# Patient Record
Sex: MALE | Race: WHITE | NOT HISPANIC OR LATINO
[De-identification: names, ages, dates, MRNs, and addresses within clinical notes are randomized per-mention and may not be internally consistent; named-entity substitution may affect disease eponyms.]

---

## 2017-02-21 ENCOUNTER — APPOINTMENT (OUTPATIENT)
Dept: INTERNAL MEDICINE | Facility: CLINIC | Age: 67
End: 2017-02-21

## 2017-02-21 VITALS
DIASTOLIC BLOOD PRESSURE: 72 MMHG | BODY MASS INDEX: 24.52 KG/M2 | TEMPERATURE: 97 F | SYSTOLIC BLOOD PRESSURE: 134 MMHG | WEIGHT: 185 LBS | HEIGHT: 73 IN

## 2017-02-21 DIAGNOSIS — F32.9 MAJOR DEPRESSIVE DISORDER, SINGLE EPISODE, UNSPECIFIED: ICD-10-CM

## 2017-08-01 ENCOUNTER — MEDICATION RENEWAL (OUTPATIENT)
Age: 67
End: 2017-08-01

## 2020-08-05 ENCOUNTER — APPOINTMENT (OUTPATIENT)
Dept: UROLOGY | Facility: CLINIC | Age: 70
End: 2020-08-05
Payer: MEDICARE

## 2020-08-05 VITALS — TEMPERATURE: 97.9 F

## 2020-08-05 LAB
APPEARANCE: CLEAR
BACTERIA: NEGATIVE
BILIRUBIN URINE: NEGATIVE
BLOOD URINE: NEGATIVE
COLOR: YELLOW
GLUCOSE QUALITATIVE U: NEGATIVE
HYALINE CASTS: 0 /LPF
KETONES URINE: NORMAL
LEUKOCYTE ESTERASE URINE: NEGATIVE
MICROSCOPIC-UA: NORMAL
NITRITE URINE: NEGATIVE
PH URINE: 6
PROTEIN URINE: NORMAL
RED BLOOD CELLS URINE: 2 /HPF
SPECIFIC GRAVITY URINE: 1.02
SQUAMOUS EPITHELIAL CELLS: 0 /HPF
UROBILINOGEN URINE: ABNORMAL
WHITE BLOOD CELLS URINE: 1 /HPF

## 2020-08-05 PROCEDURE — 99204 OFFICE O/P NEW MOD 45 MIN: CPT

## 2020-08-05 NOTE — PHYSICAL EXAM
[General Appearance - Well Nourished] : well nourished [General Appearance - Well Developed] : well developed [Normal Appearance] : normal appearance [Well Groomed] : well groomed [General Appearance - In No Acute Distress] : no acute distress [Edema] : no peripheral edema [Respiration, Rhythm And Depth] : normal respiratory rhythm and effort [Exaggerated Use Of Accessory Muscles For Inspiration] : no accessory muscle use [Abdomen Soft] : soft [Abdomen Tenderness] : non-tender [Costovertebral Angle Tenderness] : no ~M costovertebral angle tenderness [Urethral Meatus] : meatus normal [Urinary Bladder Findings] : the bladder was normal on palpation [Scrotum] : the scrotum was normal [No Prostate Nodules] : no prostate nodules [Testes Mass (___cm)] : there were no testicular masses [Normal Station and Gait] : the gait and station were normal for the patient's age [] : no rash [Oriented To Time, Place, And Person] : oriented to person, place, and time [No Focal Deficits] : no focal deficits [Affect] : the affect was normal [Mood] : the mood was normal [Not Anxious] : not anxious [No Palpable Adenopathy] : no palpable adenopathy

## 2020-08-28 ENCOUNTER — APPOINTMENT (OUTPATIENT)
Dept: UROLOGY | Facility: CLINIC | Age: 70
End: 2020-08-28
Payer: MEDICARE

## 2020-08-28 VITALS
DIASTOLIC BLOOD PRESSURE: 80 MMHG | BODY MASS INDEX: 23.19 KG/M2 | SYSTOLIC BLOOD PRESSURE: 156 MMHG | HEART RATE: 59 BPM | WEIGHT: 175 LBS | RESPIRATION RATE: 17 BRPM | HEIGHT: 73 IN

## 2020-08-28 PROCEDURE — 99215 OFFICE O/P EST HI 40 MIN: CPT

## 2020-08-28 NOTE — HISTORY OF PRESENT ILLNESS
[FreeTextEntry1] : ED\par masturbates and get orgasm \par \par started to develop 5 years\par not even masturbating\par viagra took but it never worked\par \par tried injection didn’t work\par \par has occasional pain at the base of penis when trying to play with himself\par \par  takes baby aspirin and medication for prostate \par \par  dx with prostate cancer. seeing Dr. Cannon - minimal disease

## 2020-08-28 NOTE — PHYSICAL EXAM
[General Appearance - Well Developed] : well developed [General Appearance - Well Nourished] : well nourished [Normal Appearance] : normal appearance [Well Groomed] : well groomed [Abdomen Soft] : soft [General Appearance - In No Acute Distress] : no acute distress [Costovertebral Angle Tenderness] : no ~M costovertebral angle tenderness [Urinary Bladder Findings] : the bladder was normal on palpation [Abdomen Tenderness] : non-tender [Urethral Meatus] : meatus normal [Testes Mass (___cm)] : there were no testicular masses [Scrotum] : the scrotum was normal [Edema] : no peripheral edema [No Prostate Nodules] : no prostate nodules [] : no respiratory distress [Exaggerated Use Of Accessory Muscles For Inspiration] : no accessory muscle use [Respiration, Rhythm And Depth] : normal respiratory rhythm and effort [Affect] : the affect was normal [Oriented To Time, Place, And Person] : oriented to person, place, and time [Not Anxious] : not anxious [Mood] : the mood was normal [Normal Station and Gait] : the gait and station were normal for the patient's age [No Focal Deficits] : no focal deficits [No Palpable Adenopathy] : no palpable adenopathy

## 2020-08-28 NOTE — ASSESSMENT
[FreeTextEntry1] : ED\par long standing \par possible peyronie's \par \par bring back \par for DUS\par \par Cialis 20 mg prn \par \par The submitted E/M billing level for this visit reflects the total time spent on the day of the visit including face-to-face time spent with the patient, non-face-to-face review of medical records and relevant information, documentation, and asynchronous communication with the patient after a visit via phone, email, or patient’s EHR portal after the visit.  \par The medical records reviewed are either scanned into the chart or reviewed with the patient using a patient’s electronic medical records portal for patients with records not available to [facility] via electronic transmission platforms from other institutions and labs. \par Time spend counseling and performing coordination of care was also included in determining the appropriate EM billing level.\par \par I have reviewed and verified information regarding the chief complaint and history recorded by the ancillary staff and/or the patient. I have independently reviewed and interpreted tests performed by other physicians and facilities as necessary. \par \par I have discussed with the patient differential diagnosis, reason for auxiliary tests if ordered, risks, benefits, alternatives, and complications of each form of therapy were discussed.\par \par Total time of visit 40 min,  greater than 50% of that time was spent in counseling patient about the problems listed in plan and coordinating of care. Specifically causes, evaluation, treatment options, risks, complications, and benefits of  available therapies were discussed. Questions were answered.\par

## 2020-09-08 LAB
CHOLEST SERPL-MCNC: 146 MG/DL
CHOLEST/HDLC SERPL: 2.1 RATIO
ESTIMATED AVERAGE GLUCOSE: 114 MG/DL
HBA1C MFR BLD HPLC: 5.6 %
HDLC SERPL-MCNC: 69 MG/DL
LDLC SERPL CALC-MCNC: 69 MG/DL
TESTOST BND SERPL-MCNC: 6.2 PG/ML
TESTOST SERPL-MCNC: 493.5 NG/DL
TRIGL SERPL-MCNC: 43 MG/DL

## 2020-09-11 ENCOUNTER — APPOINTMENT (OUTPATIENT)
Dept: UROLOGY | Facility: CLINIC | Age: 70
End: 2020-09-11
Payer: MEDICARE

## 2020-09-11 PROCEDURE — 99213 OFFICE O/P EST LOW 20 MIN: CPT

## 2020-09-11 NOTE — ASSESSMENT
[FreeTextEntry1] : ED\par needs bring trimix for DUS\par will change to cialis 20 mg three times a week \par \par labs normal - reviewed\par \par will reschedule DUS

## 2020-09-11 NOTE — HISTORY OF PRESENT ILLNESS
[FreeTextEntry1] : here for follow up of ED\par took cialis 20 mg once didn’t work \par \par \par didn’t bring his trimix for DUS

## 2020-10-02 ENCOUNTER — OUTPATIENT (OUTPATIENT)
Dept: OUTPATIENT SERVICES | Facility: HOSPITAL | Age: 70
LOS: 1 days | End: 2020-10-02
Payer: MEDICARE

## 2020-10-02 ENCOUNTER — APPOINTMENT (OUTPATIENT)
Dept: UROLOGY | Facility: CLINIC | Age: 70
End: 2020-10-02
Payer: MEDICARE

## 2020-10-02 DIAGNOSIS — N52.9 MALE ERECTILE DYSFUNCTION, UNSPECIFIED: ICD-10-CM

## 2020-10-02 PROCEDURE — 93980 PENILE VASCULAR STUDY: CPT

## 2020-10-02 PROCEDURE — 54235 NJX CORPORA CAVERNOSA RX AGT: CPT

## 2020-10-02 PROCEDURE — 93980 PENILE VASCULAR STUDY: CPT | Mod: 26

## 2020-10-02 PROCEDURE — 99214 OFFICE O/P EST MOD 30 MIN: CPT | Mod: 25

## 2020-10-02 NOTE — PHYSICAL EXAM
[Urethral Meatus] : meatus normal [Urinary Bladder Findings] : the bladder was normal on palpation [Scrotum] : the scrotum was normal [Testes Mass (___cm)] : there were no testicular masses [No Prostate Nodules] : no prostate nodules [FreeTextEntry1] : palpable scar dorsal shaft

## 2020-10-02 NOTE — HISTORY OF PRESENT ILLNESS
[FreeTextEntry1] : here for follow up of ED\par tried fixed dose cialis 20 mg \par no response \par here for DUS and discuss treatment \par has pain in the penis at base \par \par \par

## 2020-10-02 NOTE — ASSESSMENT
[FreeTextEntry1] : ED\par long standing \par failed response to cialis prn and scheduled\par \par DUS showed venous leak \par organic ED\par Peyronie's plaque\par Injection teaching today with 10 units can increase to 15 units\par \par start colchicine daily for Peyronie's\par plaque dorsum \par distal narrowing 2/3 of penis \par \par safety, risks of IC discussed and injections explained\par \par \par Cialis 20 mg prn \par \par The submitted E/M billing level for this visit reflects the total time spent on the day of the visit including face-to-face time spent with the patient, non-face-to-face review of medical records and relevant information, documentation, and asynchronous communication with the patient after a visit via phone, email, or patient’s EHR portal after the visit.  \par The medical records reviewed are either scanned into the chart or reviewed with the patient using a patient’s electronic medical records portal for patients with records not available to [facility] via electronic transmission platforms from other institutions and labs. \par Time spend counseling and performing coordination of care was also included in determining the appropriate EM billing level.\par \par I have reviewed and verified information regarding the chief complaint and history recorded by the ancillary staff and/or the patient. I have independently reviewed and interpreted tests performed by other physicians and facilities as necessary. \par \par I have discussed with the patient differential diagnosis, reason for auxiliary tests if ordered, risks, benefits, alternatives, and complications of each form of therapy were discussed.\par \par Total time of visit 40 min,  greater than 50% of that time was spent in counseling patient about the problems listed in plan and coordinating of care. Specifically causes, evaluation, treatment options, risks, complications, and benefits of  available therapies were discussed. Questions were answered.\par

## 2020-10-07 DIAGNOSIS — N52.9 MALE ERECTILE DYSFUNCTION, UNSPECIFIED: ICD-10-CM

## 2020-10-07 DIAGNOSIS — N48.6 INDURATION PENIS PLASTICA: ICD-10-CM

## 2021-02-24 ENCOUNTER — APPOINTMENT (OUTPATIENT)
Dept: UROLOGY | Facility: CLINIC | Age: 71
End: 2021-02-24
Payer: MEDICARE

## 2021-02-24 PROCEDURE — 99214 OFFICE O/P EST MOD 30 MIN: CPT

## 2021-02-25 LAB
APPEARANCE: CLEAR
BACTERIA: NEGATIVE
BILIRUBIN URINE: NEGATIVE
BLOOD URINE: NEGATIVE
COLOR: YELLOW
GLUCOSE QUALITATIVE U: NEGATIVE
HYALINE CASTS: 0 /LPF
KETONES URINE: NEGATIVE
LEUKOCYTE ESTERASE URINE: NEGATIVE
MICROSCOPIC-UA: NORMAL
NITRITE URINE: NEGATIVE
PH URINE: 6
PROTEIN URINE: NORMAL
PSA FREE FLD-MCNC: 14 %
PSA FREE SERPL-MCNC: 0.64 NG/ML
PSA SERPL-MCNC: 4.57 NG/ML
RED BLOOD CELLS URINE: 2 /HPF
SPECIFIC GRAVITY URINE: 1.03
SQUAMOUS EPITHELIAL CELLS: 0 /HPF
UROBILINOGEN URINE: NORMAL
WHITE BLOOD CELLS URINE: 1 /HPF

## 2021-03-24 ENCOUNTER — OUTPATIENT (OUTPATIENT)
Dept: OUTPATIENT SERVICES | Facility: HOSPITAL | Age: 71
LOS: 1 days | End: 2021-03-24
Payer: MEDICARE

## 2021-03-24 ENCOUNTER — APPOINTMENT (OUTPATIENT)
Dept: MRI IMAGING | Facility: IMAGING CENTER | Age: 71
End: 2021-03-24
Payer: MEDICARE

## 2021-03-24 ENCOUNTER — RESULT REVIEW (OUTPATIENT)
Age: 71
End: 2021-03-24

## 2021-03-24 DIAGNOSIS — R35.0 FREQUENCY OF MICTURITION: ICD-10-CM

## 2021-03-24 DIAGNOSIS — N40.1 BENIGN PROSTATIC HYPERPLASIA WITH LOWER URINARY TRACT SYMPTOMS: ICD-10-CM

## 2021-03-24 PROCEDURE — A9585: CPT

## 2021-03-24 PROCEDURE — G1004: CPT

## 2021-03-24 PROCEDURE — 72197 MRI PELVIS W/O & W/DYE: CPT

## 2021-03-24 PROCEDURE — 76377 3D RENDER W/INTRP POSTPROCES: CPT | Mod: 26

## 2021-03-24 PROCEDURE — 72197 MRI PELVIS W/O & W/DYE: CPT | Mod: 26,ME

## 2021-03-24 PROCEDURE — 76377 3D RENDER W/INTRP POSTPROCES: CPT

## 2021-06-04 ENCOUNTER — NON-APPOINTMENT (OUTPATIENT)
Age: 71
End: 2021-06-04

## 2021-06-11 ENCOUNTER — APPOINTMENT (OUTPATIENT)
Dept: UROLOGY | Facility: CLINIC | Age: 71
End: 2021-06-11
Payer: MEDICARE

## 2021-06-11 VITALS
BODY MASS INDEX: 23.3 KG/M2 | SYSTOLIC BLOOD PRESSURE: 126 MMHG | WEIGHT: 172 LBS | HEIGHT: 72 IN | DIASTOLIC BLOOD PRESSURE: 66 MMHG | HEART RATE: 54 BPM | TEMPERATURE: 97.8 F

## 2021-06-11 DIAGNOSIS — N48.6 INDURATION PENIS PLASTICA: ICD-10-CM

## 2021-06-11 DIAGNOSIS — N52.9 MALE ERECTILE DYSFUNCTION, UNSPECIFIED: ICD-10-CM

## 2021-06-11 PROCEDURE — 99215 OFFICE O/P EST HI 40 MIN: CPT

## 2021-06-11 RX ORDER — TADALAFIL 20 MG/1
20 TABLET ORAL
Qty: 30 | Refills: 6 | Status: COMPLETED | COMMUNITY
Start: 2020-08-28 | End: 2021-06-11

## 2021-06-11 RX ORDER — COLCHICINE 0.6 MG/1
0.6 TABLET ORAL DAILY
Qty: 60 | Refills: 6 | Status: COMPLETED | COMMUNITY
Start: 2020-10-02 | End: 2021-06-11

## 2021-06-11 RX ORDER — CALCIUM CARB/VITAMIN D3/VIT K1 500-100-40
31G X 5/16" TABLET,CHEWABLE ORAL
Qty: 50 | Refills: 4 | Status: COMPLETED | COMMUNITY
Start: 2020-10-02 | End: 2021-06-11

## 2021-06-11 RX ORDER — PAPAVER/PHENTOLAMINE/ALPROSTAD 150-5-50
150-5-50 VIAL (EA) INTRACAVERNOSAL
Qty: 1 | Refills: 0 | Status: DISCONTINUED | COMMUNITY
Start: 2020-08-28 | End: 2021-06-11

## 2021-06-11 NOTE — PHYSICAL EXAM
[Urethral Meatus] : meatus normal [Urinary Bladder Findings] : the bladder was normal on palpation [Scrotum] : the scrotum was normal [Testes Mass (___cm)] : there were no testicular masses [FreeTextEntry1] : no clear cut scarring in shaft, small plaque at base of penis should not prevent ICI

## 2021-06-11 NOTE — ASSESSMENT
[FreeTextEntry1] : ED\par Peyronie's disease - small plaque - not bothered\par \par tried injection teaching today in office but patient does not have manual dexterity to draw the fluid into the syringe and bed the needle few times\par \par even with reeducation - not capable of injections\par \par wife is not willing to assist \par \par discussed ErecAid but pt said it would be difficult and rather not do it now \par \par \par IPP discussed \par \par oral therapy escalation - was acceptable to patient \par \par scripts were written \par \par I have reviewed MRI - pirad 3 - discussed with dr. Cannon - no surgical intervention planned right now \par \par hence IPP could be a solution if all failed.\par \par total time 45 min of education and teaching management and coordination

## 2021-08-24 ENCOUNTER — APPOINTMENT (OUTPATIENT)
Dept: UROLOGY | Facility: CLINIC | Age: 71
End: 2021-08-24
Payer: MEDICARE

## 2021-08-24 PROCEDURE — 99214 OFFICE O/P EST MOD 30 MIN: CPT

## 2021-08-25 LAB
APPEARANCE: ABNORMAL
BACTERIA: NEGATIVE
BILIRUBIN URINE: NEGATIVE
BLOOD URINE: NEGATIVE
CALCIUM OXALATE CRYSTALS: ABNORMAL
COLOR: YELLOW
GLUCOSE QUALITATIVE U: NEGATIVE
HYALINE CASTS: 0 /LPF
KETONES URINE: NORMAL
LEUKOCYTE ESTERASE URINE: NEGATIVE
MICROSCOPIC-UA: NORMAL
NITRITE URINE: NEGATIVE
PH URINE: 6
PROTEIN URINE: ABNORMAL
PSA FREE FLD-MCNC: 16 %
PSA FREE SERPL-MCNC: 0.84 NG/ML
PSA SERPL-MCNC: 5.25 NG/ML
RED BLOOD CELLS URINE: 4 /HPF
SPECIFIC GRAVITY URINE: 1.03
SQUAMOUS EPITHELIAL CELLS: 1 /HPF
URINE COMMENTS: NORMAL
UROBILINOGEN URINE: ABNORMAL
WHITE BLOOD CELLS URINE: 5 /HPF

## 2021-09-07 ENCOUNTER — APPOINTMENT (OUTPATIENT)
Dept: UROLOGY | Facility: CLINIC | Age: 71
End: 2021-09-07

## 2021-09-28 ENCOUNTER — APPOINTMENT (OUTPATIENT)
Dept: NUCLEAR MEDICINE | Facility: CLINIC | Age: 71
End: 2021-09-28

## 2022-03-01 ENCOUNTER — APPOINTMENT (OUTPATIENT)
Dept: UROLOGY | Facility: CLINIC | Age: 72
End: 2022-03-01
Payer: MEDICARE

## 2022-03-01 PROCEDURE — 99214 OFFICE O/P EST MOD 30 MIN: CPT

## 2022-03-02 LAB
APPEARANCE: CLEAR
BACTERIA: NEGATIVE
BILIRUBIN URINE: NEGATIVE
BLOOD URINE: NEGATIVE
COLOR: YELLOW
GLUCOSE QUALITATIVE U: NEGATIVE
HYALINE CASTS: 1 /LPF
KETONES URINE: NORMAL
LEUKOCYTE ESTERASE URINE: NEGATIVE
MICROSCOPIC-UA: NORMAL
NITRITE URINE: NEGATIVE
PH URINE: 6
PROTEIN URINE: NORMAL
PSA FREE FLD-MCNC: 13 %
PSA FREE SERPL-MCNC: 0.61 NG/ML
PSA SERPL-MCNC: 4.86 NG/ML
RED BLOOD CELLS URINE: 1 /HPF
SPECIFIC GRAVITY URINE: 1.03
SQUAMOUS EPITHELIAL CELLS: 0 /HPF
UROBILINOGEN URINE: NORMAL
WHITE BLOOD CELLS URINE: 1 /HPF

## 2022-09-20 ENCOUNTER — APPOINTMENT (OUTPATIENT)
Dept: UROLOGY | Facility: CLINIC | Age: 72
End: 2022-09-20

## 2022-09-20 DIAGNOSIS — R35.0 FREQUENCY OF MICTURITION: ICD-10-CM

## 2022-09-20 PROCEDURE — 99214 OFFICE O/P EST MOD 30 MIN: CPT

## 2022-09-21 LAB
APPEARANCE: CLEAR
BACTERIA: NEGATIVE
BILIRUBIN URINE: NEGATIVE
BLOOD URINE: NEGATIVE
COLOR: YELLOW
GLUCOSE QUALITATIVE U: NEGATIVE
HYALINE CASTS: 1 /LPF
KETONES URINE: NEGATIVE
LEUKOCYTE ESTERASE URINE: NEGATIVE
MICROSCOPIC-UA: NORMAL
NITRITE URINE: NEGATIVE
PH URINE: 6
PROTEIN URINE: NORMAL
PSA FREE FLD-MCNC: 13 %
PSA FREE SERPL-MCNC: 0.67 NG/ML
PSA SERPL-MCNC: 5.14 NG/ML
RED BLOOD CELLS URINE: 3 /HPF
SPECIFIC GRAVITY URINE: 1.03
SQUAMOUS EPITHELIAL CELLS: 0 /HPF
UROBILINOGEN URINE: ABNORMAL
WHITE BLOOD CELLS URINE: 1 /HPF

## 2023-01-03 ENCOUNTER — APPOINTMENT (OUTPATIENT)
Dept: NEUROLOGY | Facility: CLINIC | Age: 73
End: 2023-01-03

## 2023-03-15 ENCOUNTER — INPATIENT (INPATIENT)
Facility: HOSPITAL | Age: 73
LOS: 4 days | Discharge: HOME | DRG: 884 | End: 2023-03-20
Attending: INTERNAL MEDICINE | Admitting: INTERNAL MEDICINE
Payer: MEDICARE

## 2023-03-15 VITALS
OXYGEN SATURATION: 98 % | DIASTOLIC BLOOD PRESSURE: 89 MMHG | WEIGHT: 160.06 LBS | RESPIRATION RATE: 16 BRPM | TEMPERATURE: 98 F | SYSTOLIC BLOOD PRESSURE: 158 MMHG | HEART RATE: 95 BPM | HEIGHT: 72 IN

## 2023-03-15 DIAGNOSIS — R41.0 DISORIENTATION, UNSPECIFIED: ICD-10-CM

## 2023-03-15 LAB
ALBUMIN SERPL ELPH-MCNC: 4.9 G/DL — SIGNIFICANT CHANGE UP (ref 3.3–5)
ALP SERPL-CCNC: 99 U/L — SIGNIFICANT CHANGE UP (ref 40–120)
ALT FLD-CCNC: 14 U/L — SIGNIFICANT CHANGE UP (ref 10–45)
ANION GAP SERPL CALC-SCNC: 13 MMOL/L — SIGNIFICANT CHANGE UP (ref 5–17)
APPEARANCE UR: CLEAR — SIGNIFICANT CHANGE UP
AST SERPL-CCNC: 22 U/L — SIGNIFICANT CHANGE UP (ref 10–40)
BACTERIA # UR AUTO: NEGATIVE — SIGNIFICANT CHANGE UP
BASOPHILS # BLD AUTO: 0.05 K/UL — SIGNIFICANT CHANGE UP (ref 0–0.2)
BASOPHILS NFR BLD AUTO: 0.8 % — SIGNIFICANT CHANGE UP (ref 0–2)
BILIRUB SERPL-MCNC: 0.8 MG/DL — SIGNIFICANT CHANGE UP (ref 0.2–1.2)
BILIRUB UR-MCNC: NEGATIVE — SIGNIFICANT CHANGE UP
BUN SERPL-MCNC: 32 MG/DL — HIGH (ref 7–23)
CALCIUM SERPL-MCNC: 10.2 MG/DL — SIGNIFICANT CHANGE UP (ref 8.4–10.5)
CHLORIDE SERPL-SCNC: 104 MMOL/L — SIGNIFICANT CHANGE UP (ref 96–108)
CO2 SERPL-SCNC: 24 MMOL/L — SIGNIFICANT CHANGE UP (ref 22–31)
COLOR SPEC: YELLOW — SIGNIFICANT CHANGE UP
CREAT SERPL-MCNC: 1.38 MG/DL — HIGH (ref 0.5–1.3)
DIFF PNL FLD: ABNORMAL
EGFR: 54 ML/MIN/1.73M2 — LOW
EOSINOPHIL # BLD AUTO: 0.06 K/UL — SIGNIFICANT CHANGE UP (ref 0–0.5)
EOSINOPHIL NFR BLD AUTO: 0.9 % — SIGNIFICANT CHANGE UP (ref 0–6)
EPI CELLS # UR: 1 /HPF — SIGNIFICANT CHANGE UP
GLUCOSE SERPL-MCNC: 119 MG/DL — HIGH (ref 70–99)
GLUCOSE UR QL: ABNORMAL
HCT VFR BLD CALC: 36.8 % — LOW (ref 39–50)
HGB BLD-MCNC: 12.1 G/DL — LOW (ref 13–17)
HYALINE CASTS # UR AUTO: 8 /LPF — HIGH (ref 0–2)
IMM GRANULOCYTES NFR BLD AUTO: 0.2 % — SIGNIFICANT CHANGE UP (ref 0–0.9)
KETONES UR-MCNC: SIGNIFICANT CHANGE UP
LEUKOCYTE ESTERASE UR-ACNC: NEGATIVE — SIGNIFICANT CHANGE UP
LYMPHOCYTES # BLD AUTO: 1.28 K/UL — SIGNIFICANT CHANGE UP (ref 1–3.3)
LYMPHOCYTES # BLD AUTO: 19.7 % — SIGNIFICANT CHANGE UP (ref 13–44)
MAGNESIUM SERPL-MCNC: 2.1 MG/DL — SIGNIFICANT CHANGE UP (ref 1.6–2.6)
MCHC RBC-ENTMCNC: 30.9 PG — SIGNIFICANT CHANGE UP (ref 27–34)
MCHC RBC-ENTMCNC: 32.9 GM/DL — SIGNIFICANT CHANGE UP (ref 32–36)
MCV RBC AUTO: 94.1 FL — SIGNIFICANT CHANGE UP (ref 80–100)
MONOCYTES # BLD AUTO: 0.77 K/UL — SIGNIFICANT CHANGE UP (ref 0–0.9)
MONOCYTES NFR BLD AUTO: 11.8 % — SIGNIFICANT CHANGE UP (ref 2–14)
NEUTROPHILS # BLD AUTO: 4.34 K/UL — SIGNIFICANT CHANGE UP (ref 1.8–7.4)
NEUTROPHILS NFR BLD AUTO: 66.6 % — SIGNIFICANT CHANGE UP (ref 43–77)
NITRITE UR-MCNC: NEGATIVE — SIGNIFICANT CHANGE UP
NRBC # BLD: 0 /100 WBCS — SIGNIFICANT CHANGE UP (ref 0–0)
PH UR: 6 — SIGNIFICANT CHANGE UP (ref 5–8)
PHOSPHATE SERPL-MCNC: 3.2 MG/DL — SIGNIFICANT CHANGE UP (ref 2.5–4.5)
PLATELET # BLD AUTO: 269 K/UL — SIGNIFICANT CHANGE UP (ref 150–400)
POTASSIUM SERPL-MCNC: 4.4 MMOL/L — SIGNIFICANT CHANGE UP (ref 3.5–5.3)
POTASSIUM SERPL-SCNC: 4.4 MMOL/L — SIGNIFICANT CHANGE UP (ref 3.5–5.3)
PROT SERPL-MCNC: 7.2 G/DL — SIGNIFICANT CHANGE UP (ref 6–8.3)
PROT UR-MCNC: ABNORMAL
RAPID RVP RESULT: SIGNIFICANT CHANGE UP
RBC # BLD: 3.91 M/UL — LOW (ref 4.2–5.8)
RBC # FLD: 12.4 % — SIGNIFICANT CHANGE UP (ref 10.3–14.5)
RBC CASTS # UR COMP ASSIST: 3 /HPF — SIGNIFICANT CHANGE UP (ref 0–4)
SARS-COV-2 RNA SPEC QL NAA+PROBE: SIGNIFICANT CHANGE UP
SODIUM SERPL-SCNC: 141 MMOL/L — SIGNIFICANT CHANGE UP (ref 135–145)
SP GR SPEC: 1.02 — SIGNIFICANT CHANGE UP (ref 1.01–1.02)
UROBILINOGEN FLD QL: ABNORMAL
WBC # BLD: 6.51 K/UL — SIGNIFICANT CHANGE UP (ref 3.8–10.5)
WBC # FLD AUTO: 6.51 K/UL — SIGNIFICANT CHANGE UP (ref 3.8–10.5)
WBC UR QL: 2 /HPF — SIGNIFICANT CHANGE UP (ref 0–5)

## 2023-03-15 PROCEDURE — 70450 CT HEAD/BRAIN W/O DYE: CPT | Mod: 26,MA

## 2023-03-15 PROCEDURE — 71045 X-RAY EXAM CHEST 1 VIEW: CPT | Mod: 26

## 2023-03-15 PROCEDURE — 99285 EMERGENCY DEPT VISIT HI MDM: CPT

## 2023-03-15 RX ORDER — CHOLECALCIFEROL (VITAMIN D3) 125 MCG
1 CAPSULE ORAL
Qty: 0 | Refills: 0 | DISCHARGE

## 2023-03-15 RX ORDER — SODIUM CHLORIDE 9 MG/ML
1000 INJECTION INTRAMUSCULAR; INTRAVENOUS; SUBCUTANEOUS ONCE
Refills: 0 | Status: COMPLETED | OUTPATIENT
Start: 2023-03-15 | End: 2023-03-15

## 2023-03-15 RX ORDER — ESCITALOPRAM OXALATE 10 MG/1
10 TABLET, FILM COATED ORAL DAILY
Refills: 0 | Status: DISCONTINUED | OUTPATIENT
Start: 2023-03-15 | End: 2023-03-16

## 2023-03-15 RX ORDER — HEPARIN SODIUM 5000 [USP'U]/ML
5000 INJECTION INTRAVENOUS; SUBCUTANEOUS EVERY 8 HOURS
Refills: 0 | Status: DISCONTINUED | OUTPATIENT
Start: 2023-03-15 | End: 2023-03-20

## 2023-03-15 RX ORDER — ATORVASTATIN CALCIUM 80 MG/1
10 TABLET, FILM COATED ORAL AT BEDTIME
Refills: 0 | Status: DISCONTINUED | OUTPATIENT
Start: 2023-03-15 | End: 2023-03-20

## 2023-03-15 RX ORDER — MIDODRINE HYDROCHLORIDE 2.5 MG/1
10 TABLET ORAL
Refills: 0 | Status: DISCONTINUED | OUTPATIENT
Start: 2023-03-15 | End: 2023-03-16

## 2023-03-15 RX ADMIN — HEPARIN SODIUM 5000 UNIT(S): 5000 INJECTION INTRAVENOUS; SUBCUTANEOUS at 21:08

## 2023-03-15 RX ADMIN — ATORVASTATIN CALCIUM 10 MILLIGRAM(S): 80 TABLET, FILM COATED ORAL at 21:09

## 2023-03-15 RX ADMIN — SODIUM CHLORIDE 1000 MILLILITER(S): 9 INJECTION INTRAMUSCULAR; INTRAVENOUS; SUBCUTANEOUS at 14:15

## 2023-03-15 RX ADMIN — SODIUM CHLORIDE 1000 MILLILITER(S): 9 INJECTION INTRAMUSCULAR; INTRAVENOUS; SUBCUTANEOUS at 15:38

## 2023-03-15 NOTE — H&P ADULT - NSHPLABSRESULTS_GEN_ALL_CORE
LABS:                        12.1   6.51  )-----------( 269      ( 15 Mar 2023 12:36 )             36.8     03-15    141  |  104  |  32<H>  ----------------------------<  119<H>  4.4   |  24  |  1.38<H>    Ca    10.2      15 Mar 2023 12:36  Phos  3.2     03-15  Mg     2.1     -15    TPro  7.2  /  Alb  4.9  /  TBili  0.8  /  DBili  x   /  AST  22  /  ALT  14  /  AlkPhos  99  03-15      Urinalysis Basic - ( 15 Mar 2023 12:36 )    Color: Yellow / Appearance: Clear / S.023 / pH: x  Gluc: x / Ketone: Trace  / Bili: Negative / Urobili: 2 mg/dL   Blood: x / Protein: 30 mg/dL / Nitrite: Negative   Leuk Esterase: Negative / RBC: 3 /hpf / WBC 2 /HPF   Sq Epi: x / Non Sq Epi: 1 /hpf / Bacteria: Negative            RADIOLOGY & ADDITIONAL TESTS:

## 2023-03-15 NOTE — ED PROVIDER NOTE - NS ED ROS FT
ROS:  Constitutional: no fevers; no chills  HEENT: no visual changes, no sore throat, no rhinorrhea  CV: no cp; no palpitations  Resp: no sob; no cough  GI: no abd pain, no nausea, no vomiting, no diarrhea, no constipation  : no dysuria, no hematuria  MSK: no myalgias; no arthralgias  skin: no rashes  neuro: no HA, no numbness; no weakness, no tingling  endo: no polyuria, no polydipsia ROS:  Constitutional: no fevers; no chills  HEENT: no visual changes, no sore throat, no rhinorrhea  CV: no cp; no palpitations  Resp: no sob; no cough  GI: no abd pain, no nausea, no vomiting, no diarrhea, no constipation  : no dysuria, no hematuria; urinary frequency  MSK: no myalgias; no arthralgias  skin: no rashes  neuro: no HA, no numbness; no weakness, no tingling  endo: no polyuria, no polydipsia

## 2023-03-15 NOTE — H&P ADULT - ASSESSMENT
74 yo M PMHx HLD, elevated PSA, anxiety/depression on Lexapro, orthostatic hypotension on Midodrine, presents to the ED for delusions & AMS x3 weeks. 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 Last night, per daughter, his delusions were the worst it has been and was unable to be re-oriented. Currently, pt is back to his baseline. Pt also having urinary frequency without dysuria. Of note, pt was tx'd with amoxicillin for a dental infection 3 weeks ago. Pt reportedly fell last week and hit his head. he is eating and drinking normally. he denies pain anywhere except his right neck, which pt has had for a while. he denies cp, sob, abd pain, n/v/d, cough, palpitations.    delirium  - likely progressing dementia  - b12, folate, tsh  - brain mri  - neurology consult    BPH  - consider starting flomax    orthostatic hypotension  - c/w midodrine    depression  - c/w lexapro  - psych eval    HLD  - c/w lipitor    dvt px   - lheparin

## 2023-03-15 NOTE — ED PROVIDER NOTE - PHYSICAL EXAMINATION
PHYSICAL EXAM:  GENERAL: non-toxic appearing; in no respiratory distress  HEAD Atraumatic, Normocephalic  ENT: in Left upper mouth: no pustules, no drainage, no signs of infeciton; no stridor; no drooling  NECK: No JVD; trachea midline; ttp to left neck, no rash; no midline ttp  EYES: PERRL, EOMs intact b/l w/out deficits; normal conjunctiva  CHEST/LUNG: CTAB no wheezes/rhonchi/rales  HEART: RRR no murmur/gallops/rubs  ABDOMEN: soft, NT, ND  EXTREMITIES: No LE edema, +2 radial pulses b/l, +2 DP/PT pulses b/l  MUSCULOSKELETAL: FROM of all 4 extremities;  NERVOUS SYSTEM:  A&Ox3, No motor deficits or sensory deficits; CNII-XII intact; has word finding difficulty (has been present for 3 years); no drift;  SKIN:  Warm and dry as visualized; superficial abrasion of scalp, no lacerations;

## 2023-03-15 NOTE — ED PROVIDER NOTE - OBJECTIVE STATEMENT
72 yo M PMHx HLD, elevated PSA, anxiety/depression on Lexapro, orthostatic hypotension on Midodrine, presents to the ED for delusions & AMS x3 weeks. 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 Last night, per daughter, his delusions were the worst it has been and was unable to be re-oriented. Currently, pt is back to his baseline. Pt also having urinary frequency without dysuria. Of note, pt was tx'd with amoxicillin for a dental infection 3 weeks ago. Pt reportedly fell last week and hit his head. he is eating and drinking normally. he denies pain anywhere except his right neck, which pt has had for a while. he denies cp, sob, abd pain, n/v/d, cough, palpitations.    PMD DR. Bakari Fraire 74 yo M PMHx HLD, elevated PSA, anxiety/depression on Lexapro, orthostatic hypotension on Midodrine, presents to the ED for delusions & AMS x3 weeks. 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 Last night, per daughter, his delusions were the worst it has been and was unable to be re-oriented. Currently, pt is back to his baseline. Pt also having urinary frequency without dysuria. Of note, pt was tx'd with amoxicillin for a dental infection 3 weeks ago. Pt reportedly fell last week and hit his head. he is eating and drinking normally. he denies pain anywhere except his right neck, which pt has had for a while. he denies cp, sob, abd pain, n/v/d, cough, palpitations.    PMD DR. Bakari Fraire

## 2023-03-15 NOTE — ED ADULT NURSE NOTE - OBJECTIVE STATEMENT
PT is a 73 year old male, currently A&OX1, with PMH of confusion, anxiety, HLD, and orthostatic hypotension on Midodrine who presents to the ED from home with c/o AMS. PT's daughter at the bedside, serving as historian, states PT is intermittently confused at baseline but still cares for himself independently and is able to walk, drive, cook, and grocery shop for himself. Daughter states 3 weeks ago PT had a root canal for a mouth infection and was started on Amoxicillin and that he has 3 days of antibiotics left. Daughter also states that his Lexapro dose was increased recently and PT became acutely more confused so the dose was decreased, additionally, PT's neurologist added Seroquel last week and PT became "delusional" stating "my wife is going to kill me" so PT stopped this medication after taking it for 3 days. PT's daughter states PT's increased AMS began ~3 weeks ago and is intermittent. PT also endorsing frequent urination with small streams. PT denies chest pain, SOB, dysuria, fevers at home, N/V/D, and dizziness. PT is resting comfortably in bed, breathing unlabored on room air, and having trouble getting his words out/forming coherent sentences. Abdomen is soft, non-tender, and non-distended. Skin is warm and dry, no diaphoresis noted. No edema noted to B/L extremities. Strong strength in B/L extremities, sensation intact. IV access established 18G in left AC. PT placed in hospital gown, non-slip socks applied. PT ambulatory with steady gait with 1x assist. Safety and comfort maintained. Daughter at the bedside.

## 2023-03-15 NOTE — ED PROVIDER NOTE - NS ED MD TWO NIGHTS YN
[Awake] : awake [Alert] : alert [Acute Distress] : no acute distress [Mass] : no breast mass [Nipple Discharge] : no nipple discharge [Axillary LAD] : no axillary lymphadenopathy [Soft] : soft [Tender] : non tender [Oriented x3] : oriented to person, place, and time [Normal] : uterus [Atrophy] : atrophy [No Bleeding] : there was no active vaginal bleeding [Uterine Adnexae] : were not tender and not enlarged Yes

## 2023-03-15 NOTE — ED PROVIDER NOTE - CLINICAL SUMMARY MEDICAL DECISION MAKING FREE TEXT BOX
Mp Darling MD. pt presenting for worsenign delusions x3 weeks, worse last night, possibly i/s/o medication changes (increased lexapro, had a short course of seroquel rx'd by neurologist). pt also with urinary frequency without d ysuria. pt well appearing. hd stable. neuro intact. had a fall 1 week ago and there is a small scalp abrasion (no reported LOC; no AC use). pt has no focal acute neurologic deficits on exam. lungs cta. rrr nl s1/s2. abd is soft nt nd. no signs of worsening dental abscess or airway compromise and thus, low suspicion for deep soft tissue infection such as rpa or ann's. possible traumatic ich from his fall, likely 2/2 medication use. possible infectious vs metabolic encephalopathy. given fall and head strike, will CTH. Will check CBC to eval WBC, anemia, plt count; CMP to eval for lyte abnormalities, renal and/or liver dysfunction. will check UA. after discussion w/ daughter, given that pt may have a safety issue at home (leaving in middle of night), pt may require admission for further w/u.

## 2023-03-15 NOTE — ED ADULT TRIAGE NOTE - CHIEF COMPLAINT QUOTE
AMS x 2 weeks since having a mouth infection. pt reports frequent urination. has been followed with PCP and neurologist.

## 2023-03-15 NOTE — ED PROVIDER NOTE - ATTENDING APP SHARED VISIT CONTRIBUTION OF CARE
I, Mp Darling, performed a history and physical exam of the patient and discussed their management with the resident and/or advanced care provider. I reviewed the resident and/or advanced care provider's note and agree with the documented findings and plan of care except where noted. I was present and available for all procedures.    I wrote H&P and mdm see above

## 2023-03-15 NOTE — H&P ADULT - HISTORY OF PRESENT ILLNESS
72 yo M PMHx HLD, elevated PSA, anxiety/depression on Lexapro, orthostatic hypotension on Midodrine, presents to the ED for delusions & AMS x3 weeks. 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 Last night, per daughter, his delusions were the worst it has been and was unable to be re-oriented. Currently, pt is back to his baseline. Pt also having urinary frequency without dysuria. Of note, pt was tx'd with amoxicillin for a dental infection 3 weeks ago. Pt reportedly fell last week and hit his head. he is eating and drinking normally. he denies pain anywhere except his right neck, which pt has had for a while. he denies cp, sob, abd pain, n/v/d, cough, palpitations

## 2023-03-15 NOTE — PATIENT PROFILE ADULT - FALL HARM RISK - HARM RISK INTERVENTIONS
Assistance with ambulation/Assistance OOB with selected safe patient handling equipment/Communicate Risk of Fall with Harm to all staff/Monitor for mental status changes/Move patient closer to nurses' station/Reinforce activity limits and safety measures with patient and family/Reorient to person, place and time as needed/Tailored Fall Risk Interventions/Toileting schedule using arm’s reach rule for commode and bathroom/Use of alarms - bed, chair and/or voice tab/Visual Cue: Yellow wristband and red socks/Bed in lowest position, wheels locked, appropriate side rails in place/Call bell, personal items and telephone in reach/Instruct patient to call for assistance before getting out of bed or chair/Non-slip footwear when patient is out of bed/[place] to call system/Physically safe environment - no spills, clutter or unnecessary equipment/Purposeful Proactive Rounding/Room/bathroom lighting operational, light cord in reach
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
## 2023-03-16 LAB
A1C WITH ESTIMATED AVERAGE GLUCOSE RESULT: 5.7 % — HIGH (ref 4–5.6)
ANION GAP SERPL CALC-SCNC: 11 MMOL/L — SIGNIFICANT CHANGE UP (ref 5–17)
BUN SERPL-MCNC: 29 MG/DL — HIGH (ref 7–23)
CALCIUM SERPL-MCNC: 9.6 MG/DL — SIGNIFICANT CHANGE UP (ref 8.4–10.5)
CHLORIDE SERPL-SCNC: 109 MMOL/L — HIGH (ref 96–108)
CO2 SERPL-SCNC: 23 MMOL/L — SIGNIFICANT CHANGE UP (ref 22–31)
CREAT SERPL-MCNC: 1.23 MG/DL — SIGNIFICANT CHANGE UP (ref 0.5–1.3)
CULTURE RESULTS: SIGNIFICANT CHANGE UP
EGFR: 62 ML/MIN/1.73M2 — SIGNIFICANT CHANGE UP
ESTIMATED AVERAGE GLUCOSE: 117 MG/DL — HIGH (ref 68–114)
FOLATE SERPL-MCNC: 12.1 NG/ML — SIGNIFICANT CHANGE UP
GLUCOSE BLDC GLUCOMTR-MCNC: 135 MG/DL — HIGH (ref 70–99)
GLUCOSE SERPL-MCNC: 93 MG/DL — SIGNIFICANT CHANGE UP (ref 70–99)
HCT VFR BLD CALC: 33.9 % — LOW (ref 39–50)
HCV AB S/CO SERPL IA: 0.08 S/CO — SIGNIFICANT CHANGE UP (ref 0–0.99)
HCV AB SERPL-IMP: SIGNIFICANT CHANGE UP
HGB BLD-MCNC: 10.6 G/DL — LOW (ref 13–17)
MAGNESIUM SERPL-MCNC: 2.1 MG/DL — SIGNIFICANT CHANGE UP (ref 1.6–2.6)
MCHC RBC-ENTMCNC: 29.6 PG — SIGNIFICANT CHANGE UP (ref 27–34)
MCHC RBC-ENTMCNC: 31.3 GM/DL — LOW (ref 32–36)
MCV RBC AUTO: 94.7 FL — SIGNIFICANT CHANGE UP (ref 80–100)
NRBC # BLD: 0 /100 WBCS — SIGNIFICANT CHANGE UP (ref 0–0)
PHOSPHATE SERPL-MCNC: 2.9 MG/DL — SIGNIFICANT CHANGE UP (ref 2.5–4.5)
PLATELET # BLD AUTO: 246 K/UL — SIGNIFICANT CHANGE UP (ref 150–400)
POTASSIUM SERPL-MCNC: 4.2 MMOL/L — SIGNIFICANT CHANGE UP (ref 3.5–5.3)
POTASSIUM SERPL-SCNC: 4.2 MMOL/L — SIGNIFICANT CHANGE UP (ref 3.5–5.3)
RBC # BLD: 3.58 M/UL — LOW (ref 4.2–5.8)
RBC # FLD: 12.3 % — SIGNIFICANT CHANGE UP (ref 10.3–14.5)
SODIUM SERPL-SCNC: 143 MMOL/L — SIGNIFICANT CHANGE UP (ref 135–145)
SPECIMEN SOURCE: SIGNIFICANT CHANGE UP
TSH SERPL-MCNC: 1.47 UIU/ML — SIGNIFICANT CHANGE UP (ref 0.27–4.2)
VIT B12 SERPL-MCNC: 723 PG/ML — SIGNIFICANT CHANGE UP (ref 232–1245)
WBC # BLD: 7.33 K/UL — SIGNIFICANT CHANGE UP (ref 3.8–10.5)
WBC # FLD AUTO: 7.33 K/UL — SIGNIFICANT CHANGE UP (ref 3.8–10.5)

## 2023-03-16 PROCEDURE — 70553 MRI BRAIN STEM W/O & W/DYE: CPT | Mod: 26

## 2023-03-16 RX ORDER — ESCITALOPRAM OXALATE 10 MG/1
5 TABLET, FILM COATED ORAL DAILY
Refills: 0 | Status: DISCONTINUED | OUTPATIENT
Start: 2023-03-16 | End: 2023-03-20

## 2023-03-16 RX ADMIN — ESCITALOPRAM OXALATE 10 MILLIGRAM(S): 10 TABLET, FILM COATED ORAL at 11:52

## 2023-03-16 RX ADMIN — ATORVASTATIN CALCIUM 10 MILLIGRAM(S): 80 TABLET, FILM COATED ORAL at 21:07

## 2023-03-16 RX ADMIN — HEPARIN SODIUM 5000 UNIT(S): 5000 INJECTION INTRAVENOUS; SUBCUTANEOUS at 05:55

## 2023-03-16 RX ADMIN — HEPARIN SODIUM 5000 UNIT(S): 5000 INJECTION INTRAVENOUS; SUBCUTANEOUS at 21:07

## 2023-03-16 RX ADMIN — HEPARIN SODIUM 5000 UNIT(S): 5000 INJECTION INTRAVENOUS; SUBCUTANEOUS at 11:52

## 2023-03-16 NOTE — BH CONSULTATION LIAISON ASSESSMENT NOTE - NSBHCHARTREVIEWVS_PSY_A_CORE FT
Vital Signs Last 24 Hrs  T(C): 36.8 (16 Mar 2023 12:27), Max: 37.3 (16 Mar 2023 06:00)  T(F): 98.2 (16 Mar 2023 12:27), Max: 99.1 (16 Mar 2023 06:00)  HR: 70 (16 Mar 2023 12:27) (70 - 87)  BP: 130/69 (16 Mar 2023 12:27) (112/70 - 177/89)  BP(mean): 97 (15 Mar 2023 16:25) (97 - 97)  RR: 18 (16 Mar 2023 12:27) (18 - 18)  SpO2: 96% (16 Mar 2023 12:27) (96% - 99%)    Parameters below as of 16 Mar 2023 06:00  Patient On (Oxygen Delivery Method): room air

## 2023-03-16 NOTE — BH CONSULTATION LIAISON ASSESSMENT NOTE - NSBHCHARTREVIEWLAB_PSY_A_CORE FT
CBC Full  -  ( 16 Mar 2023 07:15 )  WBC Count : 7.33 K/uL  Hemoglobin : 10.6 g/dL  Hematocrit : 33.9 %  Platelet Count - Automated : 246 K/uL  Mean Cell Volume : 94.7 fl  Mean Cell Hemoglobin : 29.6 pg  Mean Cell Hemoglobin Concentration : 31.3 gm/dL  Auto Neutrophil # : x  Auto Lymphocyte # : x  Auto Monocyte # : x  Auto Eosinophil # : x  Auto Basophil # : x  Auto Neutrophil % : x  Auto Lymphocyte % : x  Auto Monocyte % : x  Auto Eosinophil % : x  Auto Basophil % : x    03-16    143  |  109<H>  |  29<H>  ----------------------------<  93  4.2   |  23  |  1.23    Ca    9.6      16 Mar 2023 07:17  Phos  2.9     03-16  Mg     2.1     03-16    TPro  7.2  /  Alb  4.9  /  TBili  0.8  /  DBili  x   /  AST  22  /  ALT  14  /  AlkPhos  99  03-15  < from: CT Head No Cont (03.15.23 @ 14:05) >    IMPRESSION:  Unremarkable head CT.   Ischemic white matter disease and   atrophy typical for age.      < end of copied text >

## 2023-03-16 NOTE — BH CONSULTATION LIAISON ASSESSMENT NOTE - SUMMARY
73 year old WM with probable baseline dementia. Type of dementia unclear. Given his shuffling gait described by the family (parkinsonism), visual hallucinations, and sx. of REM sleep behavior disorder, would consider Lewy Body Dementia. Given this possibility, would avoid Haldol.   He suffers from orthostatic hypotension in the past so would avoid Seroquel.

## 2023-03-16 NOTE — BH CONSULTATION LIAISON ASSESSMENT NOTE - HPI (INCLUDE ILLNESS QUALITY, SEVERITY, DURATION, TIMING, CONTEXT, MODIFYING FACTORS, ASSOCIATED SIGNS AND SYMPTOMS)
The pt. is a  73 year old WM with 3 year history of cognitive decline (short term memory loss, word finding difficulty). Neurologists Dr. Saunders and Dr. Faria did not state what type of dementia the pt. suffers from. The pt. had his Lexapro dose increased by his neurologist 3 weeks ago to 20mg/day and 3 days later he had a change in mental status (paranoia including thinking his wife poisoned him and thinks he still works in the [employer] business). Family clarified that he has not wandered out of the house but rather, he wanted to leave the house. He also had a tooth infection and treated with Amoxicillin. His neurologist gave Seroquel 25mg qhs last Thursday-Saturday but it caused the pt. to be lightheaded and he fell down. Seroquel then was discontinued. He has a history of orthostatic hypotension. Wife says the pt. is usually sensitive to medication side effects. The family lowered the dose of Lexapro back to 10mg/day 3 days ago. Neurologist Dr. Faria thinks the pt. may suffer from Frontal Temporal Dementia as last year the PET scan showed abnormal uptake (but family cannot say location of impaired uptake). The pt. for the past 2 days has visual hallucinations as he saw a man not present in the room. Has shuffling gait at times per family but no tremors or rigidity. Has been known to jerk in his sleep and yell out during sleep. Head CT scan here negative for bleed/stroke/mass. He reports good sleep and appetite.

## 2023-03-16 NOTE — BH CONSULTATION LIAISON ASSESSMENT NOTE - NSBHCONSULTRECOMMENDOTHER_PSY_A_CORE FT
Lower the dose of Lexapro to 5mg p.o. qd  Avoid NSAIDs while taking Lexapro (risk of bleeding)  No Haldol  Orthostatic blood pressure check  Neurology follow up. Asked family to bring in results of PET scan and neuropsychological testing from last year

## 2023-03-16 NOTE — BH CONSULTATION LIAISON ASSESSMENT NOTE - NSBHMSESPABN_PSY_A_CORE
Some difficulty repeating.  Some word finding difficulty. Says the current President of the [place] is "Iden."/Soft volume/Other

## 2023-03-16 NOTE — BH CONSULTATION LIAISON ASSESSMENT NOTE - OTHER PAST PSYCHIATRIC HISTORY (INCLUDE DETAILS REGARDING ONSET, COURSE OF ILLNESS, INPATIENT/OUTPATIENT TREATMENT)
Never psychiatrically hospitalized. Never suicidal. Over the years he has been depressed, anxious, and mildly "paranoid" per family. In the past he has taken Klonopin and Cymbalta. Has compulsive personality traits as change in his environment or routine upsets him very much.

## 2023-03-16 NOTE — PROGRESS NOTE ADULT - ASSESSMENT
74 yo M PMHx HLD, elevated PSA, anxiety/depression on Lexapro, orthostatic hypotension on Midodrine, presents to the ED for delusions & AMS x3 weeks. 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 Last night, per daughter, his delusions were the worst it has been and was unable to be re-oriented. Currently, pt is back to his baseline. Pt also having urinary frequency without dysuria. Of note, pt was tx'd with amoxicillin for a dental infection 3 weeks ago. Pt reportedly fell last week and hit his head. he is eating and drinking normally. he denies pain anywhere except his right neck, which pt has had for a while. he denies cp, sob, abd pain, n/v/d, cough, palpitations.    delirium  - likely progressing dementia  - b12, folate, tsh are normal  - brain mri  - neurology consult  - psych consult    BPH  - consider starting flomax    orthostatic hypotension  - hold midodrine    depression  - c/w lexapro  - psych eval    HLD  - c/w lipitor    dvt px   - heparin

## 2023-03-16 NOTE — BH CONSULTATION LIAISON ASSESSMENT NOTE - NSBHMSEREMMEM_PSY_A_CORE
He says he is upset that "they (people at work) call me." When asked to explain this a moment later, he forgot he said the above. He says his "two daughters" are in the hallway when in fact it is his wife and daughter in the hallway. He says he has 3 children and cannot name them properly./Impaired

## 2023-03-16 NOTE — PHYSICAL THERAPY INITIAL EVALUATION ADULT - ADDITIONAL COMMENTS
Pt lives in an apartment with wife there are 0 steps to enter, first floor set up. Pt performed ADL/IADLs independently. Ambulates with no AD. One fall within the last 12 months. History taken from family at bedside. Pt lives in an apartment with wife there are 0 steps to enter, first floor set up. Pt performed ADL/IADLs independently. Ambulates with no AD. One fall within the last 12 months. History taken from family at bedside. Pt presents with some expressive aphasia.

## 2023-03-16 NOTE — BH CONSULTATION LIAISON ASSESSMENT NOTE - DESCRIPTION
Graduated high school and worked for years in the [employer] industry. 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 No cigarettes, alcohol abuse, nor illicit drug use.

## 2023-03-16 NOTE — CONSULT NOTE ADULT - ASSESSMENT
72 yo M with HLD, elevated PSA, anxiety/depression on Lexapro, orthostatic hypotension on Midodrine, dementia (followed with 2 neurologist Dr. Mayers and Dr. Marco Faria) presents to the ED for delusions & AMS x3 weeks. 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 Last night, per daughter, his delusions were the worst it has been and was unable to be re-oriented. Currently, pt is back to his baseline. Pt also having urinary frequency without dysuria. Of note, pt was tx'd with amoxicillin for a dental infection 3 weeks ago. Pt reportedly fell last week and hit his head. he is eating and drinking normally. he denies pain anywhere except his right neck, which pt has had for a while. he denies cp, sob, abd pain, n/v/d, cough, palpitations    last MRI and EEG > 1 year ago including PET scan   oddly when patient is delerius is WFD resolves, indicating there is a component of anxiety   CTH neg   B12 adn TSH WNL     Impression:   Dementia on top of Anxiety     - MRI brain w/ and w/o and EEG  - psych eval  - delerium precuations   - check FS, glucose control <180  - GI/DVT ppx  - Counseling on diet, exercise, and medication adherence was done  - Counseling on smoking cessation and alcohol consumption offered when appropriate.  - Pain assessed and judicious use of narcotics when appropriate was discussed.    - Stroke education given when appropriate.  - Importance of fall prevention discussed.   - Differential diagnosis and plan of care discussed with patient and/or family and primary team  - Thank you for allowing me to participate in the care of this patient. Call with questions.   - spoke with wife and daughter at bedside   Solomon Pradhan MD  Vascular Neurology  Office: [phone]        72 yo M with HLD, elevated PSA, anxiety/depression on Lexapro, orthostatic hypotension on Midodrine, dementia (followed with 2 neurologist Dr. Mayers and Dr. Marco Faria) presents to the ED for delusions & AMS x3 weeks. 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 Last night, per daughter, his delusions were the worst it has been and was unable to be re-oriented. Currently, pt is back to his baseline. Pt also having urinary frequency without dysuria. Of note, pt was tx'd with amoxicillin for a dental infection 3 weeks ago. Pt reportedly fell last week and hit his head. he is eating and drinking normally. he denies pain anywhere except his right neck, which pt has had for a while. he denies cp, sob, abd pain, n/v/d, cough, palpitations    last MRI and EEG > 1 year ago including PET scan   oddly when patient is delerius is WFD resolves, indicating there is a component of anxiety   CTH neg   B12 adn TSH WNL     Impression:   Dementia on top of Anxiety   possible lewy body?     - MRI brain w/ and w/o and EEG  - psych eval  - delerium precuations   - check FS, glucose control <180  - GI/DVT ppx  - Counseling on diet, exercise, and medication adherence was done  - Counseling on smoking cessation and alcohol consumption offered when appropriate.  - Pain assessed and judicious use of narcotics when appropriate was discussed.    - Stroke education given when appropriate.  - Importance of fall prevention discussed.   - Differential diagnosis and plan of care discussed with patient and/or family and primary team  - Thank you for allowing me to participate in the care of this patient. Call with questions.   - spoke with wife and daughter at bedside (happens to be PA)    - would obtain otuaptient records if able (had neuropsych testing and PET scan)   Solomon Pradhan MD  Vascular Neurology  Office: [phone]

## 2023-03-16 NOTE — PHYSICAL THERAPY INITIAL EVALUATION ADULT - PERTINENT HX OF CURRENT PROBLEM, REHAB EVAL
72 yo M PMHx HLD, elevated PSA, anxiety/depression on Lexapro, orthostatic hypotension on Midodrine, presents to the ED for delusions & AMS x3 weeks. 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 Last night, per daughter, his delusions were the worst it has been and was unable to be re-oriented. Currently, pt is back to his baseline. Pt also having urinary frequency without dysuria. Of note, pt was tx'd with amoxicillin for a dental infection 3 weeks ago. Pt reportedly fell last week and hit his head. he is eating and drinking normally. he denies pain anywhere except his right neck, which pt has had for a while. he denies cp, sob, abd pain, n/v/d, cough, palpitations. 3/15 CTH Unremarkable head CT.   Ischemic white matter disease and atrophy typical for age. CXR clear lungs

## 2023-03-16 NOTE — BH CONSULTATION LIAISON ASSESSMENT NOTE - CURRENT MEDICATION
MEDICATIONS  (STANDING):  atorvastatin 10 milliGRAM(s) Oral at bedtime  escitalopram 10 milliGRAM(s) Oral daily  heparin   Injectable 5000 Unit(s) SubCutaneous every 8 hours    MEDICATIONS  (PRN):

## 2023-03-17 LAB
ANION GAP SERPL CALC-SCNC: 11 MMOL/L — SIGNIFICANT CHANGE UP (ref 5–17)
BUN SERPL-MCNC: 29 MG/DL — HIGH (ref 7–23)
CALCIUM SERPL-MCNC: 9.6 MG/DL — SIGNIFICANT CHANGE UP (ref 8.4–10.5)
CHLORIDE SERPL-SCNC: 107 MMOL/L — SIGNIFICANT CHANGE UP (ref 96–108)
CO2 SERPL-SCNC: 24 MMOL/L — SIGNIFICANT CHANGE UP (ref 22–31)
CREAT SERPL-MCNC: 1.21 MG/DL — SIGNIFICANT CHANGE UP (ref 0.5–1.3)
EGFR: 64 ML/MIN/1.73M2 — SIGNIFICANT CHANGE UP
GLUCOSE BLDC GLUCOMTR-MCNC: 128 MG/DL — HIGH (ref 70–99)
GLUCOSE BLDC GLUCOMTR-MCNC: 96 MG/DL — SIGNIFICANT CHANGE UP (ref 70–99)
GLUCOSE SERPL-MCNC: 98 MG/DL — SIGNIFICANT CHANGE UP (ref 70–99)
HCT VFR BLD CALC: 34.7 % — LOW (ref 39–50)
HGB BLD-MCNC: 11.2 G/DL — LOW (ref 13–17)
MCHC RBC-ENTMCNC: 30 PG — SIGNIFICANT CHANGE UP (ref 27–34)
MCHC RBC-ENTMCNC: 32.3 GM/DL — SIGNIFICANT CHANGE UP (ref 32–36)
MCV RBC AUTO: 93 FL — SIGNIFICANT CHANGE UP (ref 80–100)
NRBC # BLD: 0 /100 WBCS — SIGNIFICANT CHANGE UP (ref 0–0)
PLATELET # BLD AUTO: 236 K/UL — SIGNIFICANT CHANGE UP (ref 150–400)
POTASSIUM SERPL-MCNC: 4.4 MMOL/L — SIGNIFICANT CHANGE UP (ref 3.5–5.3)
POTASSIUM SERPL-SCNC: 4.4 MMOL/L — SIGNIFICANT CHANGE UP (ref 3.5–5.3)
RBC # BLD: 3.73 M/UL — LOW (ref 4.2–5.8)
RBC # FLD: 12.2 % — SIGNIFICANT CHANGE UP (ref 10.3–14.5)
SODIUM SERPL-SCNC: 142 MMOL/L — SIGNIFICANT CHANGE UP (ref 135–145)
T PALLIDUM AB TITR SER: NEGATIVE — SIGNIFICANT CHANGE UP
WBC # BLD: 7.17 K/UL — SIGNIFICANT CHANGE UP (ref 3.8–10.5)
WBC # FLD AUTO: 7.17 K/UL — SIGNIFICANT CHANGE UP (ref 3.8–10.5)

## 2023-03-17 RX ORDER — SODIUM CHLORIDE 9 MG/ML
1000 INJECTION INTRAMUSCULAR; INTRAVENOUS; SUBCUTANEOUS
Refills: 0 | Status: DISCONTINUED | OUTPATIENT
Start: 2023-03-17 | End: 2023-03-19

## 2023-03-17 RX ORDER — SENNA PLUS 8.6 MG/1
2 TABLET ORAL AT BEDTIME
Refills: 0 | Status: DISCONTINUED | OUTPATIENT
Start: 2023-03-17 | End: 2023-03-20

## 2023-03-17 RX ADMIN — ATORVASTATIN CALCIUM 10 MILLIGRAM(S): 80 TABLET, FILM COATED ORAL at 21:45

## 2023-03-17 RX ADMIN — HEPARIN SODIUM 5000 UNIT(S): 5000 INJECTION INTRAVENOUS; SUBCUTANEOUS at 21:44

## 2023-03-17 RX ADMIN — ESCITALOPRAM OXALATE 5 MILLIGRAM(S): 10 TABLET, FILM COATED ORAL at 12:37

## 2023-03-17 RX ADMIN — HEPARIN SODIUM 5000 UNIT(S): 5000 INJECTION INTRAVENOUS; SUBCUTANEOUS at 06:05

## 2023-03-17 RX ADMIN — SODIUM CHLORIDE 75 MILLILITER(S): 9 INJECTION INTRAMUSCULAR; INTRAVENOUS; SUBCUTANEOUS at 10:02

## 2023-03-17 RX ADMIN — HEPARIN SODIUM 5000 UNIT(S): 5000 INJECTION INTRAVENOUS; SUBCUTANEOUS at 14:08

## 2023-03-17 NOTE — PROGRESS NOTE ADULT - ASSESSMENT
72 yo M PMHx HLD, elevated PSA, anxiety/depression on Lexapro, orthostatic hypotension on Midodrine, presents to the ED for delusions & AMS x3 weeks. 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 Last night, per daughter, his delusions were the worst it has been and was unable to be re-oriented. Currently, pt is back to his baseline. Pt also having urinary frequency without dysuria. Of note, pt was tx'd with amoxicillin for a dental infection 3 weeks ago. Pt reportedly fell last week and hit his head. he is eating and drinking normally. he denies pain anywhere except his right neck, which pt has had for a while. he denies cp, sob, abd pain, n/v/d, cough, palpitations.    delirium  - likely progressing dementia  - b12, folate, tsh are normal  - brain mri done  - EEG   - neurology consult appreciated  - psych consult appreciated    BPH  - consider starting flomax    orthostatic hypotension  - hold midodrine    depression  - c/w lexapro  - psych eval    HLD  - c/w lipitor    dvt px   - heparin     72 yo M PMHx HLD, elevated PSA, anxiety/depression on Lexapro, orthostatic hypotension on Midodrine, presents to the ED for delusions & AMS x3 weeks. 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 Last night, per daughter, his delusions were the worst it has been and was unable to be re-oriented. Currently, pt is back to his baseline. Pt also having urinary frequency without dysuria. Of note, pt was tx'd with amoxicillin for a dental infection 3 weeks ago. Pt reportedly fell last week and hit his head. he is eating and drinking normally. he denies pain anywhere except his right neck, which pt has had for a while. he denies cp, sob, abd pain, n/v/d, cough, palpitations.    delirium  - likely progressing dementia  - b12, folate, tsh are normal  - brain mri done  - EEG   - neurology consult appreciated  - psych consult appreciated  - decrease lexapro    BPH  - consider starting proscar    orthostatic hypotension  - start iv fluids  - may be related to autonomic dysfunction  - hold midodrine    depression  - c/w lexapro  - psych eval    HLD  - c/w lipitor    dvt px   - heparin

## 2023-03-17 NOTE — PROGRESS NOTE ADULT - ASSESSMENT
72 yo M with HLD, elevated PSA, anxiety/depression on Lexapro, orthostatic hypotension on Midodrine, dementia (followed with 2 neurologist Dr. Mayers and Dr. Marco Faria) presents to the ED for delusions & AMS x3 weeks. 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 Last night, per daughter, his delusions were the worst it has been and was unable to be re-oriented. Currently, pt is back to his baseline. Pt also having urinary frequency without dysuria. Of note, pt was tx'd with amoxicillin for a dental infection 3 weeks ago. Pt reportedly fell last week and hit his head. he is eating and drinking normally. he denies pain anywhere except his right neck, which pt has had for a while. he denies cp, sob, abd pain, n/v/d, cough, palpitations    last MRI and EEG > 1 year ago including PET scan   oddly when patient is delerius is WFD resolves, indicating there is a component of anxiety   CTH neg   B12 adn TSH WNL   reveiwed outpatient records. prior MRI essentially unreamrkable. EEG wtih bitemporal slowwing at that time. abnormal PET.   B12 723 WNL. TSH WNL 1.47.   A1c 5.7   MRI brain wtih L frontal DVA     Impression:   Dementia on top of Anxiety   possible lewy body?   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    -  EEG r/o seizures from L frontal now especially given L frontal DVA   - would call neuro IR to see if he would benefit from cerebral angio.   - psych eval,recs appreciated. spoke with Dr Delgado   - delerium precuations   - check FS, glucose control <180  - GI/DVT ppx  - spoke with  daughter at bedside (happens to be PA)    - would obtain otuaptient records if able (had neuropsych testing and PET scan)   Solomon Pradhan MD  Vascular Neurology  Office: [phone]

## 2023-03-18 LAB
ANION GAP SERPL CALC-SCNC: 10 MMOL/L — SIGNIFICANT CHANGE UP (ref 5–17)
BUN SERPL-MCNC: 25 MG/DL — HIGH (ref 7–23)
CALCIUM SERPL-MCNC: 9.4 MG/DL — SIGNIFICANT CHANGE UP (ref 8.4–10.5)
CHLORIDE SERPL-SCNC: 106 MMOL/L — SIGNIFICANT CHANGE UP (ref 96–108)
CO2 SERPL-SCNC: 25 MMOL/L — SIGNIFICANT CHANGE UP (ref 22–31)
CREAT SERPL-MCNC: 1.15 MG/DL — SIGNIFICANT CHANGE UP (ref 0.5–1.3)
EGFR: 68 ML/MIN/1.73M2 — SIGNIFICANT CHANGE UP
GLUCOSE SERPL-MCNC: 89 MG/DL — SIGNIFICANT CHANGE UP (ref 70–99)
HCT VFR BLD CALC: 34.1 % — LOW (ref 39–50)
HGB BLD-MCNC: 11.3 G/DL — LOW (ref 13–17)
MCHC RBC-ENTMCNC: 30.8 PG — SIGNIFICANT CHANGE UP (ref 27–34)
MCHC RBC-ENTMCNC: 33.1 GM/DL — SIGNIFICANT CHANGE UP (ref 32–36)
MCV RBC AUTO: 92.9 FL — SIGNIFICANT CHANGE UP (ref 80–100)
NRBC # BLD: 0 /100 WBCS — SIGNIFICANT CHANGE UP (ref 0–0)
PLATELET # BLD AUTO: 240 K/UL — SIGNIFICANT CHANGE UP (ref 150–400)
POTASSIUM SERPL-MCNC: 4.4 MMOL/L — SIGNIFICANT CHANGE UP (ref 3.5–5.3)
POTASSIUM SERPL-SCNC: 4.4 MMOL/L — SIGNIFICANT CHANGE UP (ref 3.5–5.3)
RBC # BLD: 3.67 M/UL — LOW (ref 4.2–5.8)
RBC # FLD: 12.2 % — SIGNIFICANT CHANGE UP (ref 10.3–14.5)
SODIUM SERPL-SCNC: 141 MMOL/L — SIGNIFICANT CHANGE UP (ref 135–145)
WBC # BLD: 8.3 K/UL — SIGNIFICANT CHANGE UP (ref 3.8–10.5)
WBC # FLD AUTO: 8.3 K/UL — SIGNIFICANT CHANGE UP (ref 3.8–10.5)

## 2023-03-18 PROCEDURE — 95720 EEG PHY/QHP EA INCR W/VEEG: CPT

## 2023-03-18 RX ORDER — LANOLIN ALCOHOL/MO/W.PET/CERES
3 CREAM (GRAM) TOPICAL AT BEDTIME
Refills: 0 | Status: DISCONTINUED | OUTPATIENT
Start: 2023-03-18 | End: 2023-03-20

## 2023-03-18 RX ADMIN — HEPARIN SODIUM 5000 UNIT(S): 5000 INJECTION INTRAVENOUS; SUBCUTANEOUS at 05:22

## 2023-03-18 RX ADMIN — ESCITALOPRAM OXALATE 5 MILLIGRAM(S): 10 TABLET, FILM COATED ORAL at 12:11

## 2023-03-18 RX ADMIN — ATORVASTATIN CALCIUM 10 MILLIGRAM(S): 80 TABLET, FILM COATED ORAL at 21:45

## 2023-03-18 RX ADMIN — HEPARIN SODIUM 5000 UNIT(S): 5000 INJECTION INTRAVENOUS; SUBCUTANEOUS at 21:45

## 2023-03-18 RX ADMIN — Medication 3 MILLIGRAM(S): at 21:44

## 2023-03-18 NOTE — CONSULT NOTE ADULT - SUBJECTIVE AND OBJECTIVE BOX
Neurology Consult    Reason for Consult: Patient is a 73y old  Male who presents with a chief complaint of delirium, word finding (16 Mar 2023 11:07)      HPI:  72 yo M PMHx HLD, elevated PSA, anxiety/depression on Lexapro, orthostatic hypotension on Midodrine, presents to the ED for delusions & AMS x3 weeks. 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 Last night, per daughter, his delusions were the worst it has been and was unable to be re-oriented. Currently, pt is back to his baseline. Pt also having urinary frequency without dysuria. Of note, pt was tx'd with amoxicillin for a dental infection 3 weeks ago. Pt reportedly fell last week and hit his head. he is eating and drinking normally. he denies pain anywhere except his right neck, which pt has had for a while. he denies cp, sob, abd pain, n/v/d, cough, palpitations (15 Mar 2023 16:46)       PAST MEDICAL & SURGICAL HISTORY:      Allergies: Allergies    No Known Allergies    Intolerances    quetiapine (Other)      Social History: Denies toxic habits including tobacco, ETOH or illicit drugs.    Family History: FAMILY HISTORY:  . No family history of strokes    Medications: MEDICATIONS  (STANDING):  atorvastatin 10 milliGRAM(s) Oral at bedtime  escitalopram 10 milliGRAM(s) Oral daily  heparin   Injectable 5000 Unit(s) SubCutaneous every 8 hours    MEDICATIONS  (PRN):      Review of Systems:  CONSTITUTIONAL:  No weight loss, fever, chills, weakness or fatigue.  HEENT:  Eyes:  No visual loss, blurred vision, double vision or yellow sclera. Ears, Nose, Throat:  No hearing loss, sneezing, congestion, runny nose or sore throat.  SKIN:  No rash or itching.  CARDIOVASCULAR:  No chest pain, chest pressure or chest discomfort. No palpitations or edema.  RESPIRATORY:  No shortness of breath, cough or sputum.  GASTROINTESTINAL:  No anorexia, nausea, vomiting or diarrhea. No abdominal pain or blood.  GENITOURINARY:  No burning on urination or incontinence   NEUROLOGICAL:  No headache, dizziness, syncope, paralysis, ataxia, numbness or tingling in the extremities. No change in bowel or bladder control. no limb weakness. no vision changes.  dementia WFD   MUSCULOSKELETAL:  No muscle, back pain, joint pain or stiffness.  HEMATOLOGIC:  No anemia, bleeding or bruising.  LYMPHATICS:  No enlarged nodes. No history of splenectomy.  PSYCHIATRIC:  No history of depression or anxiety.  ENDOCRINOLOGIC:  No reports of sweating, cold or heat intolerance. No polyuria or polydipsia.      Vitals:  Vital Signs Last 24 Hrs  T(C): 37.3 (16 Mar 2023 06:00), Max: 37.3 (16 Mar 2023 06:00)  T(F): 99.1 (16 Mar 2023 06:00), Max: 99.1 (16 Mar 2023 06:00)  HR: 79 (16 Mar 2023 08:54) (78 - 87)  BP: 148/75 (16 Mar 2023 08:54) (112/70 - 181/94)  BP(mean): 97 (15 Mar 2023 16:25) (97 - 120)  RR: 18 (16 Mar 2023 06:00) (18 - 18)  SpO2: 97% (16 Mar 2023 06:00) (97% - 100%)    Parameters below as of 16 Mar 2023 06:00  Patient On (Oxygen Delivery Method): room air        General Exam:   General Appearance: Appropriately dressed and in no acute distress       Head: Normocephalic, atraumatic and no dysmorphic features  Ear, Nose, and Throat: Moist mucous membranes  CVS: S1S2+  Resp: No SOB, no wheeze or rhonchi  GI: soft NT/ND  Extremities: No edema or cyanosis  Skin: No bruises or rashes     Neurological Exam:  Mental Status: Awake, alert and oriented x 2.  Able to follow simple and complex verbal commands. Able to name and repeat. stuttering speech. No obvious aphasia or dysarthria noted.   Cranial Nerves: PERRL, EOMI, VFFC, sensation V1-V3 intact,  no obvious facial asymmetry, equal elevation of palate, scm/trap 5/5, tongue is midline on protrusion. no obvious papilledema on fundoscopic exam. hearing is grossly intact.   Motor: Normal bulk, tone and strength throughout. Fine finger movements were intact and symmetric. no tremors or drift noted.    Sensation: Intact to light touch and pinprick throughout. no right/left confusion. no extinction to tactile on DSS. Romberg was negative.   Reflexes: 1+ throughout at biceps, brachioradialis, triceps, patellars and ankles bilaterally and equal. No clonus. R toe and L toe were both downgoing.  Coordination: No dysmetria on FNF or HKS  Gait: deferred     Data/Labs/Imaging which I personally reviewed.     Labs:     CBC Full  -  ( 16 Mar 2023 07:15 )  WBC Count : 7.33 K/uL  RBC Count : 3.58 M/uL  Hemoglobin : 10.6 g/dL  Hematocrit : 33.9 %  Platelet Count - Automated : 246 K/uL  Mean Cell Volume : 94.7 fl  Mean Cell Hemoglobin : 29.6 pg  Mean Cell Hemoglobin Concentration : 31.3 gm/dL  Auto Neutrophil # : x  Auto Lymphocyte # : x  Auto Monocyte # : x  Auto Eosinophil # : x  Auto Basophil # : x  Auto Neutrophil % : x  Auto Lymphocyte % : x  Auto Monocyte % : x  Auto Eosinophil % : x  Auto Basophil % : x    03-16    143  |  109<H>  |  29<H>  ----------------------------<  93  4.2   |  23  |  1.23    Ca    9.6      16 Mar 2023 07:17  Phos  2.9     03-16  Mg     2.1     -16    TPro  7.2  /  Alb  4.9  /  TBili  0.8  /  DBili  x   /  AST  22  /  ALT  14  /  AlkPhos  99  03-15    LIVER FUNCTIONS - ( 15 Mar 2023 12:36 )  Alb: 4.9 g/dL / Pro: 7.2 g/dL / ALK PHOS: 99 U/L / ALT: 14 U/L / AST: 22 U/L / GGT: x             Urinalysis Basic - ( 15 Mar 2023 12:36 )    Color: Yellow / Appearance: Clear / S.023 / pH: x  Gluc: x / Ketone: Trace  / Bili: Negative / Urobili: 2 mg/dL   Blood: x / Protein: 30 mg/dL / Nitrite: Negative   Leuk Esterase: Negative / RBC: 3 /hpf / WBC 2 /HPF   Sq Epi: x / Non Sq Epi: 1 /hpf / Bacteria: Negative        < from: CT Head No Cont (03.15.23 @ 14:05) >    ACC: [id number] EXAM:  CT BRAIN   ORDERED BY: ELIAN CASTILLOMIAH     PROCEDURE DATE:  03/15/2023          INTERPRETATION:  CT head without IV contrast    CLINICAL INFORMATION:    ACUTE MENTAL STATUS CHANGE, CONFUSION    TECHNIQUE:  Contiguous axial 3 mm thick images were acquired.  This data   set was reconstructed in the sagittal and coronal planes.  Contrast was   not administered for this examination.    DOSE INFORMATION:   This scan was performed using automatic exposure   control (radiation dose reduction software) to obtain a diagnostic image   quality scan with patient dose as low as reasonably achievable.   Total   DLP for this examination is estimated at 852 mGy*cm.    COMPARISON:   No relevant prior examinations are available for review.    FINDINGS:    BRAIN:    The brain  demonstrates mild symmetric indistinct abnormal   diminished attenuation within the deep cerebral hemispheric white matter.    This finding is most evident in the periatrial and periventricular   posterior centrum semiovale white matter.  No cerebral cortical lesion is   recognized.   Cerebral cortical gray-white matter differentiation is   maintained.  No acute cerebral cortical infarct is seen.  No intracranial   hemorrhage is found.  No mass effect is found in the brain.    CSF SPACES:  The ventricles, sulci and basal cisterns  appear mild to   moderately dilated reflecting diffuse brain volume loss.   No   differential cerebral cortical atrophy is recognized.    Xanthogranulomatous peripherally calcifiedcystic changes noted in   lateral ventricular choroid plexus. G    HEAD AND NECK STRUCTURES:  The orbits are unremarkable.  The included   paranasal sinuses  demonstrate opacification of the left maxillary sinus.   Sclerotic wall thickening suggest this disease is long-standing. There is   mild contiguous mucosal disease within left intra-abdominal ethmoid air   cells.  The nasal cavity appears intact.  The nasopharynx is symmetric.    The central skull base is intact.  The temporal bones demonstrate patent   petrous air cells.  The calvarium appears unremarkable.      IMPRESSION:  Unremarkable head CT.   Ischemic white matter disease and   atrophy typical for age.    --- End of Report ---            BREONNA DE LEÓN MD; Attending Radiologist  This document has been electronically signed. Mar 15 2023  2:10PM    < end of copied text >    
Guillaume Moise   73M hx anxiety/depression, dimentia, p/w delusions & AMS x3 weeks, now improved. He has a 3yr history of word-finding difficulty which has slowly progressed. Neurology noted that when patient is delerious his WFD resolves, indicating there is a component of anxiety. We are consulted for finding of possible L frontal DVA. No associated vascular malformation seen on MR post con imaging. No acute infarcts or other abnormalities.     --Anticoagulation--  heparin   Injectable 5000 Unit(s) SubCutaneous every 8 hours    T(C): 36.7 (03-18-23 @ 12:29), Max: 37.1 (03-17-23 @ 16:28)  HR: 77 (03-18-23 @ 12:29) (57 - 77)  BP: 147/88 (03-18-23 @ 12:29) (137/73 - 160/78)  RR: 18 (03-18-23 @ 12:29) (18 - 18)  SpO2: 100% (03-18-23 @ 12:29) (97% - 100%)  Wt(kg): --    Exam: moderate word-finding difficulty, 2/4 naming objects, mild difficulty with repetition, no signs of comprehension deficit, otherwise AO2-3 (difficulty with year, likely due to his WFD), PERRL, EOMI, no droop/drift, AG 5/5, SILT

## 2023-03-18 NOTE — CONSULT NOTE ADULT - ASSESSMENT
Guillaume Moise   73M hx anxiety/depression, dimentia, p/w delusions & AMS x3 weeks, now improved. He has a 3yr history of word-finding difficulty which has slowly progressed. Neurology noted that when patient is delerious his WFD resolves, indicating there is a component of anxiety. We are consulted for finding of possible L frontal DVA. No associated vascular malformation seen on MR post con imaging. No acute infarcts or other abnormalities. Exam: moderate word-finding difficulty, 2/4 naming objects, mild difficulty with repetition, no signs of comprehension deficit, otherwise intact.      -no acute neurosurgical intervention  -non urgent MRA w/con (can obtain as outpatient)  -Outpatient FU with Dr. Aydin bender, after MRA completed

## 2023-03-18 NOTE — PROGRESS NOTE ADULT - ASSESSMENT
72 yo M PMHx HLD, elevated PSA, anxiety/depression on Lexapro, orthostatic hypotension on Midodrine, presents to the ED for delusions & AMS x3 weeks. 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 Last night, per daughter, his delusions were the worst it has been and was unable to be re-oriented. Currently, pt is back to his baseline. Pt also having urinary frequency without dysuria. Of note, pt was tx'd with amoxicillin for a dental infection 3 weeks ago. Pt reportedly fell last week and hit his head. he is eating and drinking normally. he denies pain anywhere except his right neck, which pt has had for a while. he denies cp, sob, abd pain, n/v/d, cough, palpitations.    delirium  - likely progressing dementia  - b12, folate, tsh are normal  - brain mri done  - EEG   - neurology consult appreciated    venous anomaly on MRI  - NS consult    dementia  - psych consult appreciated  - decrease lexapro    BPH  - consider starting proscar    orthostatic hypotension  - start iv fluids  - may be related to autonomic dysfunction  - hold midodrine    depression  - c/w lexapro  - psych eval    HLD  - c/w lipitor    dvt px   - heparin    can be discharged when cleared by psych and neurology

## 2023-03-19 PROCEDURE — 70546 MR ANGIOGRAPH HEAD W/O&W/DYE: CPT | Mod: 26

## 2023-03-19 RX ADMIN — HEPARIN SODIUM 5000 UNIT(S): 5000 INJECTION INTRAVENOUS; SUBCUTANEOUS at 13:19

## 2023-03-19 RX ADMIN — SENNA PLUS 2 TABLET(S): 8.6 TABLET ORAL at 21:42

## 2023-03-19 RX ADMIN — ATORVASTATIN CALCIUM 10 MILLIGRAM(S): 80 TABLET, FILM COATED ORAL at 21:42

## 2023-03-19 RX ADMIN — ESCITALOPRAM OXALATE 5 MILLIGRAM(S): 10 TABLET, FILM COATED ORAL at 12:24

## 2023-03-19 RX ADMIN — HEPARIN SODIUM 5000 UNIT(S): 5000 INJECTION INTRAVENOUS; SUBCUTANEOUS at 05:25

## 2023-03-19 RX ADMIN — HEPARIN SODIUM 5000 UNIT(S): 5000 INJECTION INTRAVENOUS; SUBCUTANEOUS at 21:43

## 2023-03-19 NOTE — EEG REPORT - NS EEG TEXT BOX
3/18/2023   2:40:05 PM  to 03/19/23 08:00   x Duration (hours) = 17hr 16 min    FINDINGS:      Background:  Symmetry: symmetric  Continuous: continuous  PDR: symmetric, well-modulated 7.5-8 Hz activity, with amplitude to 40 uV, that attenuated to eye opening.  Low amplitude frontal beta noted in wakefulness.  Reactivity: present  Voltage: normal, [defined typically between 20-150uV]  Anterior Posterior Gradient: present  Breach: absent    Background Slowing:  Generalized slowing: Continuous diffuse theta even during max wakefulness  Focal slowing: none was present.    State Changes:   -Drowsiness was characterized by fragmentation, attenuation, and slowing of the background activity.    -N2 sleep transients with symmetric spindles and K-complexes.    Sporadic Epileptiform Discharges:   None    Rhythmic and Periodic Patterns (RPPs):  None     Electrographic and Electroclinical seizures:  None    Other Clinical Events:  None    Activation Procedures:   -Hyperventilation was not performed.    -Photic stimulation was not performed.    Artifacts:  Intermittent myogenic and movement artifacts were noted.    ECG:  The heart rate on single channel ECG was predominantly between 80-90 BPM.    EEG Summary / Classification:    Abnormal EEG in the awake / drowsy / asleep states.    Background slowing, generalized, mild    EEG Impression / Clinical Correlate:    Mild diffuse/multifocal cerebral dysfunction, not specific as to etiology  No epileptic discharges recorded.  No seizures recorded.    This is a prelim report only, pending review with attending prior to finalization.     Caden Capellan MD     Fellow, [facility]     ------------------------------------     EEG Reading Room: [phone]     On Call Service After Hours: [phone]  3/18/2023   2:40:05 PM  to 03/19/23 08:00   x Duration (hours) = 17hr 16 min    FINDINGS:      Background:  Symmetry: symmetric  Continuous: continuous  PDR: symmetric, well-modulated 7.5-8 Hz activity, with amplitude to 40 uV, that attenuated to eye opening.  Low amplitude frontal beta noted in wakefulness.  Reactivity: present  Voltage: normal, [defined typically between 20-150uV]  Anterior Posterior Gradient: present  Breach: absent    Background Slowing:  Generalized slowing: Continuous diffuse theta even during max wakefulness  Focal slowing: none was present.    State Changes:   -Drowsiness was characterized by fragmentation, attenuation, and slowing of the background activity.    -N2 sleep transients with symmetric spindles and K-complexes.    Sporadic Epileptiform Discharges:   None    Rhythmic and Periodic Patterns (RPPs):  None     Electrographic and Electroclinical seizures:  None    Other Clinical Events:  None    Activation Procedures:   -Hyperventilation was not performed.    -Photic stimulation was not performed.    Artifacts:  Intermittent myogenic and movement artifacts were noted.    ECG:  The heart rate on single channel ECG was predominantly between 80-90 BPM.    EEG Summary / Classification:    Abnormal EEG in the awake / drowsy / asleep states.  Background slowing, generalized, mild    EEG Impression / Clinical Correlate:    Mild diffuse/multifocal cerebral dysfunction, not specific as to etiology  No epileptic discharges recorded.  No seizures recorded.    Caden Capellan MD     Fellow, [facility]     ------------------------------------     EEG Reading Room: [phone]     On Call Service After Hours: [phone]  3/18/2023   2:40:05 PM  to 03/19/23 14:20   x Duration (hours) = 23hr 36 min    FINDINGS:      Background:  Symmetry: symmetric  Continuous: continuous  PDR: symmetric, well-modulated 7.5-8 Hz activity, with amplitude to 40 uV, that attenuated to eye opening.  Low amplitude frontal beta noted in wakefulness.  Reactivity: present  Voltage: normal, [defined typically between 20-150uV]  Anterior Posterior Gradient: present  Breach: absent    Background Slowing:  Generalized slowing: Continuous diffuse theta even during max wakefulness  Focal slowing: none was present.    State Changes:   -Drowsiness was characterized by fragmentation, attenuation, and slowing of the background activity.    -N2 sleep transients with symmetric spindles and K-complexes.    Sporadic Epileptiform Discharges:   None    Rhythmic and Periodic Patterns (RPPs):  None     Electrographic and Electroclinical seizures:  None    Other Clinical Events:  None    Activation Procedures:   -Hyperventilation was not performed.    -Photic stimulation was not performed.    Artifacts:  Intermittent myogenic and movement artifacts were noted.    ECG:  The heart rate on single channel ECG was predominantly between 80-90 BPM.    EEG Summary / Classification:    Abnormal EEG in the awake / drowsy / asleep states.  Background slowing, generalized, mild    EEG Impression / Clinical Correlate:    Mild diffuse/multifocal cerebral dysfunction, not specific as to etiology  No epileptic discharges recorded.  No seizures recorded.    Caden Capellan MD     Fellow, [facility]     ------------------------------------     EEG Reading Room: [phone]     On Call Service After Hours: [phone]

## 2023-03-19 NOTE — PROGRESS NOTE ADULT - ASSESSMENT
74 yo M PMHx HLD, elevated PSA, anxiety/depression on Lexapro, orthostatic hypotension on Midodrine, presents to the ED for delusions & AMS x3 weeks. 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 Last night, per daughter, his delusions were the worst it has been and was unable to be re-oriented. Currently, pt is back to his baseline. Pt also having urinary frequency without dysuria. Of note, pt was tx'd with amoxicillin for a dental infection 3 weeks ago. Pt reportedly fell last week and hit his head. he is eating and drinking normally. he denies pain anywhere except his right neck, which pt has had for a while. he denies cp, sob, abd pain, n/v/d, cough, palpitations.    delirium  - likely progressing dementia  - b12, folate, tsh are normal  - brain mri done  - EEG done  - neurology consult appreciated    venous anomaly on MRI  - NS consult noted  - MRA with contrast    dementia  - psych consult appreciated  - decrease lexapro    BPH  - consider starting proscar    orthostatic hypotension  - start iv fluids  - may be related to autonomic dysfunction  - hold midodrine    depression  - c/w lexapro  - psych eval    insomnia  - melatonin 3 mg qhs     HLD  - c/w lipitor    dvt px   - heparin    can be discharged when cleared by psych and neurology

## 2023-03-20 ENCOUNTER — TRANSCRIPTION ENCOUNTER (OUTPATIENT)
Age: 73
End: 2023-03-20

## 2023-03-20 VITALS
OXYGEN SATURATION: 100 % | DIASTOLIC BLOOD PRESSURE: 80 MMHG | TEMPERATURE: 98 F | SYSTOLIC BLOOD PRESSURE: 148 MMHG | HEART RATE: 71 BPM | RESPIRATION RATE: 18 BRPM

## 2023-03-20 PROCEDURE — 83036 HEMOGLOBIN GLYCOSYLATED A1C: CPT

## 2023-03-20 PROCEDURE — 70553 MRI BRAIN STEM W/O & W/DYE: CPT

## 2023-03-20 PROCEDURE — 96360 HYDRATION IV INFUSION INIT: CPT

## 2023-03-20 PROCEDURE — 70546 MR ANGIOGRAPH HEAD W/O&W/DYE: CPT

## 2023-03-20 PROCEDURE — 80053 COMPREHEN METABOLIC PANEL: CPT

## 2023-03-20 PROCEDURE — A9585: CPT

## 2023-03-20 PROCEDURE — 83735 ASSAY OF MAGNESIUM: CPT

## 2023-03-20 PROCEDURE — 36415 COLL VENOUS BLD VENIPUNCTURE: CPT

## 2023-03-20 PROCEDURE — 99285 EMERGENCY DEPT VISIT HI MDM: CPT | Mod: 25

## 2023-03-20 PROCEDURE — 82746 ASSAY OF FOLIC ACID SERUM: CPT

## 2023-03-20 PROCEDURE — 87086 URINE CULTURE/COLONY COUNT: CPT

## 2023-03-20 PROCEDURE — 85027 COMPLETE CBC AUTOMATED: CPT

## 2023-03-20 PROCEDURE — 0225U NFCT DS DNA&RNA 21 SARSCOV2: CPT

## 2023-03-20 PROCEDURE — 70551 MRI BRAIN STEM W/O DYE: CPT

## 2023-03-20 PROCEDURE — 70450 CT HEAD/BRAIN W/O DYE: CPT | Mod: MA

## 2023-03-20 PROCEDURE — 85025 COMPLETE CBC W/AUTO DIFF WBC: CPT

## 2023-03-20 PROCEDURE — 97162 PT EVAL MOD COMPLEX 30 MIN: CPT

## 2023-03-20 PROCEDURE — 84100 ASSAY OF PHOSPHORUS: CPT

## 2023-03-20 PROCEDURE — 71045 X-RAY EXAM CHEST 1 VIEW: CPT

## 2023-03-20 PROCEDURE — 86780 TREPONEMA PALLIDUM: CPT

## 2023-03-20 PROCEDURE — 82962 GLUCOSE BLOOD TEST: CPT

## 2023-03-20 PROCEDURE — 95714 VEEG EA 12-26 HR UNMNTR: CPT

## 2023-03-20 PROCEDURE — 82607 VITAMIN B-12: CPT

## 2023-03-20 PROCEDURE — 81001 URINALYSIS AUTO W/SCOPE: CPT

## 2023-03-20 PROCEDURE — 80048 BASIC METABOLIC PNL TOTAL CA: CPT

## 2023-03-20 PROCEDURE — 95700 EEG CONT REC W/VID EEG TECH: CPT

## 2023-03-20 PROCEDURE — 84443 ASSAY THYROID STIM HORMONE: CPT

## 2023-03-20 PROCEDURE — 86803 HEPATITIS C AB TEST: CPT

## 2023-03-20 RX ORDER — ATORVASTATIN CALCIUM 80 MG/1
1 TABLET, FILM COATED ORAL
Qty: 0 | Refills: 0 | DISCHARGE

## 2023-03-20 RX ORDER — MIDODRINE HYDROCHLORIDE 2.5 MG/1
1 TABLET ORAL
Qty: 0 | Refills: 0 | DISCHARGE

## 2023-03-20 RX ORDER — ESCITALOPRAM OXALATE 10 MG/1
1 TABLET, FILM COATED ORAL
Qty: 0 | Refills: 0 | DISCHARGE
Start: 2023-03-20

## 2023-03-20 RX ORDER — ACETAMINOPHEN 500 MG
2 TABLET ORAL
Qty: 0 | Refills: 0 | DISCHARGE

## 2023-03-20 RX ORDER — ATORVASTATIN CALCIUM 80 MG/1
1 TABLET, FILM COATED ORAL
Qty: 0 | Refills: 0 | DISCHARGE
Start: 2023-03-20

## 2023-03-20 RX ORDER — IBUPROFEN 200 MG
1 TABLET ORAL
Qty: 0 | Refills: 0 | DISCHARGE

## 2023-03-20 RX ORDER — DIVALPROEX SODIUM 500 MG/1
1 TABLET, DELAYED RELEASE ORAL
Qty: 90 | Refills: 0
Start: 2023-03-20 | End: 2023-04-18

## 2023-03-20 RX ORDER — ESCITALOPRAM OXALATE 10 MG/1
1 TABLET, FILM COATED ORAL
Qty: 0 | Refills: 0 | DISCHARGE

## 2023-03-20 RX ADMIN — ESCITALOPRAM OXALATE 5 MILLIGRAM(S): 10 TABLET, FILM COATED ORAL at 12:46

## 2023-03-20 RX ADMIN — HEPARIN SODIUM 5000 UNIT(S): 5000 INJECTION INTRAVENOUS; SUBCUTANEOUS at 06:15

## 2023-03-20 NOTE — DISCHARGE NOTE PROVIDER - NSDCCPCAREPLAN_GEN_ALL_CORE_FT
PRINCIPAL DISCHARGE DIAGNOSIS  Diagnosis: Acute delirium  Assessment and Plan of Treatment: CT head negative for acute hemorrhage. EEG revealed mild diffuse/multifocal cerebral dysfunction, not specific as to etiology No epileptic discharges recorded.  No seizures recorded.   Patient to follow up outpatient with neurologist for further workup. Neurosx recommends non urgent MRA w/contrast (can obtain as outpatient). Patient to follow up with Dr. Perrin after MRA completed.       PRINCIPAL DISCHARGE DIAGNOSIS  Diagnosis: Acute delirium  Assessment and Plan of Treatment: CT head negative for acute hemorrhage. EEG revealed mild diffuse/multifocal cerebral dysfunction, not specific as to etiology No epileptic discharges recorded.  No seizures recorded.   Patient to follow up outpatient with neurologist for further workup.      SECONDARY DISCHARGE DIAGNOSES  Diagnosis: HLD (hyperlipidemia)  Assessment and Plan of Treatment: Low salt, low fat, low cholesterol, diabetic diet if appropriate  Continue medication as prescribed  Exercise, increase your activity level  Pt. verbalized an understanding of all instructions.      Diagnosis: Dementia  Assessment and Plan of Treatment: HOME CARE INSTRUCTIONS  The care of individuals with dementia is varied and dependent upon the progression of the dementia. The following suggestions are intended for the person living with, or caring for, the person with dementia.  Create a safe environment.  Remove the locks on bathroom doors to prevent the person from accidentally locking himself or herself in.  Use childproof latches on kitchen cabinets and any place where cleaning supplies, chemicals, or alcohol are kept.  Use childproof covers in unused electrical outlets.  Install childproof devices to keep doors and windows secured.  Remove stove knobs or install safety knobs and an automatic shut-off on the stove.  Lower the temperature on water heaters.  Label medicines and keep them locked up.  Secure knives, lighters, matches, power tools, and guns, and keep these items out of reach.  Keep the house free from clutter. Remove rugs or anything that might contribute to a fall.  Remove objects that might break and hurt the person.  Make sure lighting is good, both inside and outside.  Install grab rails as needed.  Use a monitoring device to alert you to falls or other needs for help.   Reduce confusion.  Keep familiar objects and people around.  Use night lights or dim lights at night.  Label items or areas.  Use reminders, notes, or directions for daily activities or tasks.Keep a simple, consistent routine for waking, meals, bathing, dressing, and bedtime  Create a calm, quiet environment.  Place large clocks and calendars prominently.  Display emergency numbers and home address near all telephones..  Have a consistent nighttime routine.  Ensure a regular walking or physical activity schedule. Involve the person in daily activities as much as possible.  Limit napping during the day.  Limit caffeine.  Attend social events that stimulate rather than overwhelm the senses.  Encourage good nutrition and hydration.  Reduce distractions during meal times and snacks..  Monitor chewing and swallowing ability.  Continue with routine vision, hearin    Diagnosis: Venous anomaly  Assessment and Plan of Treatment: Neurosx recommends non urgent MRA w/contrast (can obtain as outpatient). Patient to follow up with Dr. Perrin after MRA completed.     PRINCIPAL DISCHARGE DIAGNOSIS  Diagnosis: Acute delirium  Assessment and Plan of Treatment: CT head negative for acute hemorrhage. EEG revealed mild diffuse/multifocal cerebral dysfunction, not specific as to etiology No epileptic discharges recorded.  No seizures recorded.   Patient to follow up outpatient with neurologist for further workup.  if you get very agitated, you can take depakote as needed every 8 hours. Please follow up with psychiatry after discharge      SECONDARY DISCHARGE DIAGNOSES  Diagnosis: HLD (hyperlipidemia)  Assessment and Plan of Treatment: Low salt, low fat, low cholesterol, diabetic diet if appropriate  Continue medication as prescribed  Exercise, increase your activity level  Pt. verbalized an understanding of all instructions.      Diagnosis: Dementia  Assessment and Plan of Treatment: HOME CARE INSTRUCTIONS  The care of individuals with dementia is varied and dependent upon the progression of the dementia. The following suggestions are intended for the person living with, or caring for, the person with dementia.  Create a safe environment.  Remove the locks on bathroom doors to prevent the person from accidentally locking himself or herself in.  Use childproof latches on kitchen cabinets and any place where cleaning supplies, chemicals, or alcohol are kept.  Use childproof covers in unused electrical outlets.  Install childproof devices to keep doors and windows secured.  Remove stove knobs or install safety knobs and an automatic shut-off on the stove.  Lower the temperature on water heaters.  Label medicines and keep them locked up.  Secure knives, lighters, matches, power tools, and guns, and keep these items out of reach.  Keep the house free from clutter. Remove rugs or anything that might contribute to a fall.  Remove objects that might break and hurt the person.  Make sure lighting is good, both inside and outside.  Install grab rails as needed.  Use a monitoring device to alert you to falls or other needs for help.   Reduce confusion.  Keep familiar objects and people around.  Use night lights or dim lights at night.  Label items or areas.  Use reminders, notes, or directions for daily activities or tasks.Keep a simple, consistent routine for waking, meals, bathing, dressing, and bedtime  Create a calm, quiet environment.  Place large clocks and calendars prominently.  Display emergency numbers and home address near all telephones..  Have a consistent nighttime routine.  Ensure a regular walking or physical activity schedule. Involve the person in daily activities as much as possible.  Limit napping during the day.  Limit caffeine.  Attend social events that stimulate rather than overwhelm the senses.  Encourage good nutrition and hydration.  Reduce distractions during meal times and snacks..  Monitor chewing and swallowing ability.  Continue with routine vision, hearin    Diagnosis: Venous anomaly  Assessment and Plan of Treatment: Neurosx recommends non urgent MRA w/contrast (can obtain as outpatient). Patient to follow up with Dr. Perrin after MRA completed.

## 2023-03-20 NOTE — PROGRESS NOTE ADULT - PROVIDER SPECIALTY LIST ADULT
Internal Medicine
Neurology
Neurology

## 2023-03-20 NOTE — BH CONSULTATION LIAISON PROGRESS NOTE - NSBHCHARTREVIEWVS_PSY_A_CORE FT
Vital Signs Last 24 Hrs  T(C): 36.4 (17 Mar 2023 11:32), Max: 37.6 (17 Mar 2023 00:40)  T(F): 97.6 (17 Mar 2023 11:32), Max: 99.7 (17 Mar 2023 00:40)  HR: 75 (17 Mar 2023 11:32) (55 - 76)  BP: 171/90 (17 Mar 2023 11:32) (152/66 - 183/88)  BP(mean): --  RR: 18 (17 Mar 2023 11:32) (18 - 18)  SpO2: 100% (17 Mar 2023 11:32) (97% - 100%)    Parameters below as of 17 Mar 2023 11:32  Patient On (Oxygen Delivery Method): room air    
Vital Signs Last 24 Hrs  T(C): 36.7 (18 Mar 2023 12:29), Max: 37.1 (17 Mar 2023 16:28)  T(F): 98 (18 Mar 2023 12:29), Max: 98.8 (17 Mar 2023 16:28)  HR: 77 (18 Mar 2023 12:29) (57 - 77)  BP: 147/88 (18 Mar 2023 12:29) (137/73 - 160/78)  BP(mean): --  RR: 18 (18 Mar 2023 12:29) (18 - 18)  SpO2: 100% (18 Mar 2023 12:29) (97% - 100%)    Parameters below as of 18 Mar 2023 12:29  Patient On (Oxygen Delivery Method): room air    
Vital Signs Last 24 Hrs  T(C): 36.6 (20 Mar 2023 12:11), Max: 37.3 (20 Mar 2023 06:48)  T(F): 97.8 (20 Mar 2023 12:11), Max: 99.1 (20 Mar 2023 06:48)  HR: 71 (20 Mar 2023 12:11) (66 - 71)  BP: 148/80 (20 Mar 2023 12:11) (122/76 - 148/80)  BP(mean): --  RR: 18 (20 Mar 2023 12:11) (18 - 18)  SpO2: 100% (20 Mar 2023 12:11) (97% - 100%)    Parameters below as of 20 Mar 2023 12:11  Patient On (Oxygen Delivery Method): room air

## 2023-03-20 NOTE — DISCHARGE NOTE PROVIDER - NSDCFUADDAPPT_GEN_ALL_CORE_FT
APPTS ARE READY TO BE MADE: [X] YES    Best Family or Patient Contact (if needed):    Additional Information about above appointments (if needed):    1:   2:   3:     Other comments or requests:    APPTS ARE READY TO BE MADE: [X] YES    Best Family or Patient Contact (if needed):    Additional Information about above appointments (if needed):    1:   2:   3:     Other comments or requests:   Patient has indicated appointment details were received. No scheduling assistance is needed at this time.

## 2023-03-20 NOTE — BH CONSULTATION LIAISON PROGRESS NOTE - NSBHCONSULTRECOMMENDOTHER_PSY_A_CORE FT
Lexapro 5mg qd  Melatonin 2mg p.o. qhs prn  Neurology follow up  I told wife to call his outpatient neurologist for follow up including neuropsychological testing in the next month as outpatient
Lexapro 5mg p.o. qd  Neurology follow up as outpatient  Discussed with family they can call me for outpatient follow up prn  If hypotensive, avoid Melatonin.   
Continue with Lexapro 5mg p.o. qd  Neurology follow up

## 2023-03-20 NOTE — DISCHARGE NOTE PROVIDER - NSDCFUSCHEDAPPT_GEN_ALL_CORE_FT
Dimitris Cannon Physician [facility]  UROLOGY [address] [place] [address]  Scheduled Appointment: 06/13/2023

## 2023-03-20 NOTE — BH CONSULTATION LIAISON PROGRESS NOTE - NSICDXBHPRIMARYDX_PSY_ALL_CORE
Major neurocognitive disorder due to unknown etiology   F03.90  

## 2023-03-20 NOTE — BH CONSULTATION LIAISON PROGRESS NOTE - NSBHCONSULTMEDSEVERE_PSY_A_CORE FT
Depacon 125mg IV q8h prn (hold if platelet count falls below 100,000 or if AST or ALT rise more than twice from baseline)
Depacon 125mg IV q8h prn (hold if platelet count falls below 100,000)
Depakote 125mg p.o. q8h prn

## 2023-03-20 NOTE — DISCHARGE NOTE NURSING/CASE MANAGEMENT/SOCIAL WORK - PATIENT PORTAL LINK FT
You can access the FollowMyHealth Patient Portal offered by [facility] by registering at the following website: [email or web address]. By joining [employer]’s FollowMyHealth portal, you will also be able to view your health information using other applications (apps) compatible with our system.

## 2023-03-20 NOTE — DISCHARGE NOTE PROVIDER - HOSPITAL COURSE
HPI: 74 yo M PMHx HLD, elevated PSA, anxiety/depression on Lexapro, orthostatic hypotension on Midodrine, presents to the ED for delusions & AMS x3 weeks.      Hospital Course: Patient was admitted for further medical management HPI: 74 yo M PMHx HLD, elevated PSA, anxiety/depression on Lexapro, orthostatic hypotension on Midodrine, presents to the ED for delusions & AMS x3 weeks.    Hospital Course: Patient was admitted for further medical management . Patient was evaluated by psych and neuro. CT head negative for acute hemorrhage. EEG revealed mild diffuse/multifocal cerebral dysfunction, not specific as to etiology No epileptic discharges recorded.  No seizures recorded. Patient to follow up outpatient with neurologist for further workup. Hospital course was complicated by orthostatic hypotension s/p iv fluids Neurosurgery was consulted for finding of possible L frontal DVA. No associated vascular malformation seen on MR post con imaging. No acute infarcts or other abnormalities. Neurosx recommends no acute neurosurgical intervention - non urgent MRA w/con (can obtain as outpatient). Patient to follow up with Dr. Perrin after MRA completed.    Discharge/Dispo/Med rec discussed with attending  ____. Patient medically cleared for discharge ____ with outpatient follow up with PCP,___,____.   HPI: 74 yo M PMHx HLD, elevated PSA, anxiety/depression on Lexapro, orthostatic hypotension on Midodrine, presents to the ED for delusions & AMS x3 weeks.    Hospital Course: Patient was admitted for further medical management . Patient was evaluated by psych and neuro. CT head negative for acute hemorrhage. EEG revealed mild diffuse/multifocal cerebral dysfunction, not specific as to etiology No epileptic discharges recorded.  No seizures recorded. Patient to follow up outpatient with neurologist for further workup. Hospital course was complicated by orthostatic hypotension s/p iv fluids Neurosurgery was consulted for finding of possible L frontal DVA. No associated vascular malformation seen on MR post con imaging. No acute infarcts or other abnormalities. Neurosx recommends no acute neurosurgical intervention - non urgent MRA w/con (can obtain as outpatient). Patient to follow up with Dr. Perrin after MRA completed.    Discharge/Dispo/Med rec discussed with attending Dr. Franco. Patient medically cleared for discharge with outpatient follow up with PCP, neurology and psych

## 2023-03-20 NOTE — DISCHARGE NOTE PROVIDER - CARE PROVIDER_API CALL
Bakari Fraire)  Critical Care Medicine; Internal Medicine; Pulmonary Disease  [address] [place] [address]  Phone: [phone]  Fax: [phone]  Follow Up Time: 1 week   Bakari Fraire)  Critical Care Medicine; Internal Medicine; Pulmonary Disease  [address] [place] [address]  Phone: [phone]  Fax: [phone]  Follow Up Time: 1 week    Minoo Campbell)  Neurology; Vascular Neurology  [address] [place] [address] [address]  [place] [address]  Phone: [phone]  Fax: [phone]  Follow Up Time: 1 week    Tierney Saunders)  Clinical Neurophysiology; Neurology  [address] [place] [address]  Phone: [phone]  Fax: [phone]  Follow Up Time: 1 week   Bakari Fraire)  Critical Care Medicine; Internal Medicine; Pulmonary Disease  [address] [place] [address]  Phone: [phone]  Fax: [phone]  Follow Up Time: 1 week    Minoo Campbell)  Neurology; Vascular Neurology  [address] [place] [address] [address]  [place] [address]  Phone: [phone]  Fax: [phone]  Follow Up Time: Routine    Tierney Saunders)  Clinical Neurophysiology; Neurology  [address] [place] [address]  Phone: [phone]  Fax: [phone]  Follow Up Time: 1 week   Bakari Fraire)  Critical Care Medicine; Internal Medicine; Pulmonary Disease  [address] [place] [address]  Phone: [phone]  Fax: [phone]  Follow Up Time: 1 week    Minoo Campbell)  Neurology; Vascular Neurology  [address] [place] [address]  Phone: [phone]  Fax: [phone]  Follow Up Time: Routine    Tierney Saunders)  Clinical Neurophysiology; Neurology  [address] [place] [address]  Phone: [phone]  Fax: [phone]  Follow Up Time: 1 week    Mariano Delgado)  Psychiatry  [facility], [address] [address]  Phone: [phone]  Fax: [phone]  Follow Up Time: Routine

## 2023-03-20 NOTE — DISCHARGE NOTE PROVIDER - NSDCMRMEDTOKEN_GEN_ALL_CORE_FT
atorvastatin 10 mg oral tablet: 1 tab(s) orally once a day (at bedtime)  escitalopram 5 mg oral tablet: 1 tab(s) orally once a day  Vitamin D3 25 mcg (1000 intl units) oral tablet: 1 tab(s) orally once a day   atorvastatin 10 mg oral tablet: 1 tab(s) orally once a day (at bedtime)  Depakote 125 mg oral delayed release tablet: 1 tab(s) orally every 8 hours, As Needed -for agitation   escitalopram 5 mg oral tablet: 1 tab(s) orally once a day  Vitamin D3 25 mcg (1000 intl units) oral tablet: 1 tab(s) orally once a day

## 2023-03-20 NOTE — DISCHARGE NOTE PROVIDER - PROVIDER TOKENS
PROVIDER:[TOKEN:[[id number]],FOLLOWUP:[1 week]] PROVIDER:[TOKEN:[[id number]],FOLLOWUP:[1 week]],PROVIDER:[TOKEN:[[id number]],FOLLOWUP:[1 week]],PROVIDER:[TOKEN:[[id number]],FOLLOWUP:[1 week]] PROVIDER:[TOKEN:[[id number]],FOLLOWUP:[1 week]],PROVIDER:[TOKEN:[[id number]],FOLLOWUP:[Routine]],PROVIDER:[TOKEN:[[id number]],FOLLOWUP:[1 week]] PROVIDER:[TOKEN:[[id number]],FOLLOWUP:[1 week]],PROVIDER:[TOKEN:[[id number]],FOLLOWUP:[Routine]],PROVIDER:[TOKEN:[[id number]],FOLLOWUP:[1 week]],PROVIDER:[TOKEN:[[id number]],FOLLOWUP:[Routine]]

## 2023-03-20 NOTE — BH CONSULTATION LIAISON PROGRESS NOTE - NSBHASSESSMENTFT_PSY_ALL_CORE
Dementia  Delirium resolved. Suspect infection/Amoxicillin was main etiology. 
Dementia, type unknown  Delirium resolved. ?antibiotic induced?   I discussed with the pt. and his family that he should not drive a car given his mental status. The pt. agrees with this.   We are avoiding Haldol given possibility of Lewy Body Dementia and are avoiding Seroquel as it made him feel dizzy (?hypotensive)  
Dementia with delirium  Avoiding Haldol given possibility of having Lewy Body Dementia  Avoiding Seroquel as he had bout of lightheadedness and fell after taking it (?orthostatic)

## 2023-03-20 NOTE — BH CONSULTATION LIAISON PROGRESS NOTE - CURRENT MEDICATION
MEDICATIONS  (STANDING):  atorvastatin 10 milliGRAM(s) Oral at bedtime  escitalopram 5 milliGRAM(s) Oral daily  heparin   Injectable 5000 Unit(s) SubCutaneous every 8 hours  senna 2 Tablet(s) Oral at bedtime    MEDICATIONS  (PRN):  melatonin 3 milliGRAM(s) Oral at bedtime PRN Insomnia  
MEDICATIONS  (STANDING):  atorvastatin 10 milliGRAM(s) Oral at bedtime  escitalopram 5 milliGRAM(s) Oral daily  heparin   Injectable 5000 Unit(s) SubCutaneous every 8 hours  senna 2 Tablet(s) Oral at bedtime  sodium chloride 0.9%. 1000 milliLiter(s) (75 mL/Hr) IV Continuous <Continuous>    MEDICATIONS  (PRN):  
MEDICATIONS  (STANDING):  atorvastatin 10 milliGRAM(s) Oral at bedtime  escitalopram 5 milliGRAM(s) Oral daily  heparin   Injectable 5000 Unit(s) SubCutaneous every 8 hours  sodium chloride 0.9%. 1000 milliLiter(s) (75 mL/Hr) IV Continuous <Continuous>    MEDICATIONS  (PRN):

## 2023-03-20 NOTE — BH CONSULTATION LIAISON PROGRESS NOTE - NSBHFUPINTERVALHXFT_PSY_A_CORE
Daughter of the pt. reports impaired sleep last night. No agitation/psychosis. He completed his EEG this weekend and it showed mild generalized background slowing but no seizure activity. Seen by neurosurgery. No intervention recommended. Appetite fair. 
Hypertensive last night but not agitated per wife (daughter stayed overnight and provided history to the wife). Calm today. No hallucinations over the past 48 hours. He slept poorly last night. Eats well. Awaiting EEG. 
Wife at bedside says the pt. had poor sleep last night. He eats well. No agitation today. I discussed the case today with neurologist Dr. Rome as wife showed me old records of neuropsychological testing and PET scan (given diagnosis in October 2021 of MCI but Lewy Body Dementia also mentioned as differential diagnosis). Wife reports no visual hallucinations or delusions today. She says he took Amoxicillin until the day of admission here. 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

## 2023-03-20 NOTE — DISCHARGE NOTE NURSING/CASE MANAGEMENT/SOCIAL WORK - NSDCPEFALRISK_GEN_ALL_CORE
For information on Fall & Injury Prevention, visit: [email or web address] OR  [email or web address] OR  https://www.cdc.gov/steadi/patient.html

## 2023-03-20 NOTE — DISCHARGE NOTE PROVIDER - NPI NUMBER (FOR SYSADMIN USE ONLY) :
[[id number]] [[id number]],[[id number]],[[id number]] [[id number]],[[id number]],[[id number]],[[id number]]

## 2023-03-20 NOTE — BH CONSULTATION LIAISON PROGRESS NOTE - NSBHCHARTREVIEWLAB_PSY_A_CORE FT
EEG Summary / Classification:    Abnormal EEG in the awake / drowsy / asleep states.  Background slowing, generalized, mild    EEG Impression / Clinical Correlate:    Mild diffuse/multifocal cerebral dysfunction, not specific as to etiology  No epileptic discharges recorded.  No seizures recorded.  
CBC Full  -  ( 17 Mar 2023 07:10 )  WBC Count : 7.17 K/uL  Hemoglobin : 11.2 g/dL  Hematocrit : 34.7 %  Platelet Count - Automated : 236 K/uL  Mean Cell Volume : 93.0 fl  Mean Cell Hemoglobin : 30.0 pg  Mean Cell Hemoglobin Concentration : 32.3 gm/dL  Auto Neutrophil # : x  Auto Lymphocyte # : x  Auto Monocyte # : x  Auto Eosinophil # : x  Auto Basophil # : x  Auto Neutrophil % : x  Auto Lymphocyte % : x  Auto Monocyte % : x  Auto Eosinophil % : x  Auto Basophil % : x    03-17    142  |  107  |  29<H>  ----------------------------<  98  4.4   |  24  |  1.21    Ca    9.6      17 Mar 2023 07:09  Phos  2.9     03-16  Mg     2.1     03-16    B12 723   Folate 12.1   TSH 1.47
CBC Full  -  ( 18 Mar 2023 07:38 )  WBC Count : 8.30 K/uL  Hemoglobin : 11.3 g/dL  Hematocrit : 34.1 %  Platelet Count - Automated : 240 K/uL  Mean Cell Volume : 92.9 fl  Mean Cell Hemoglobin : 30.8 pg  Mean Cell Hemoglobin Concentration : 33.1 gm/dL  Auto Neutrophil # : x  Auto Lymphocyte # : x  Auto Monocyte # : x  Auto Eosinophil # : x  Auto Basophil # : x  Auto Neutrophil % : x  Auto Lymphocyte % : x  Auto Monocyte % : x  Auto Eosinophil % : x  Auto Basophil % : x    03-18    141  |  106  |  25<H>  ----------------------------<  89  4.4   |  25  |  1.15    Ca    9.4      18 Mar 2023 07:38

## 2023-03-20 NOTE — BH CONSULTATION LIAISON PROGRESS NOTE - MSE UNSTRUCTURED FT
WM in bed. Wife and daughter at bedside. He is calm, alert, but oriented x 1-2 (aphasia is impacting on orientation as he says "this is a place to get better" but cannot state Hospital). No diaphoresis or tremors. No hallucinations nor delusions. No suicidal ideation and plan and no homicidal ideation or plan. Mood is "ok" and affect constricted. Attention and concentration are fair but impaired short term memory. Long term memory fair. 
WM in chair with wife at his side. He is calm, alert, but oriented x 2-3 (not oriented to year). No diaphoresis or tremors. I and J impaired. Speech is vague at times with some word finding difficulty. No hallucinations, delusions, suicidal ideation or plan, nor homicidal ideation or plan. Mood is "ok" and affect constricted. Attention and concentration fair and short term memory impaired. Long term memory fair. 
WM sitting up in chair with family (wife and daughter) at bedside. He is calm, neatly groomed, alert and oriented x 3. I and J impaired. Speech is aphasic with normal volume. No hallucinations/delusions/suicidal ideation expressed. Mood is "ok" and affect appropriate, mildly constricted. Attention and concentration and LTM fair but impaired short term memory.

## 2023-03-20 NOTE — PROGRESS NOTE ADULT - ASSESSMENT
74 yo M with HLD, elevated PSA, anxiety/depression on Lexapro, orthostatic hypotension on Midodrine, dementia (followed with 2 neurologist Dr. Mayers and Dr. Marco Faria) presents to the ED for delusions & AMS x3 weeks. 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 Last night, per daughter, his delusions were the worst it has been and was unable to be re-oriented. Currently, pt is back to his baseline. Pt also having urinary frequency without dysuria. Of note, pt was tx'd with amoxicillin for a dental infection 3 weeks ago. Pt reportedly fell last week and hit his head. he is eating and drinking normally. he denies pain anywhere except his right neck, which pt has had for a while. he denies cp, sob, abd pain, n/v/d, cough, palpitations    last MRI and EEG > 1 year ago including PET scan   oddly when patient is delerius is WFD resolves, indicating there is a component of anxiety   CTH neg   B12 adn TSH WNL   reveiwed outpatient records. prior MRI essentially unreamrkable. EEG wtih bitemporal slowwing at that time. abnormal PET.   B12 723 WNL. TSH WNL 1.47.   A1c 5.7   MRI brain wtih L frontal DVA   MRA H neg   EEG neg     Impression:   Dementia on top of Anxiety   possible lewy body?   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    - would call neuro IR to see if he would benefit from cerebral angio. outpatient f.u   - psych eval,recs appreciated. spoke with Dr Delgado   - delerium precuations   - check FS, glucose control <180  - GI/DVT ppx  - spoke with  daughter at bedside (happens to be PA)    - would obtain otuaptient records if able (had neuropsych testing and PET scan)   - d/c planning   Solomon Pradhan MD  Vascular Neurology  Office: [phone]

## 2023-03-20 NOTE — PROGRESS NOTE ADULT - SUBJECTIVE AND OBJECTIVE BOX
Neurology Progress Note    S: Patient seen and examined. No new events overnight. patient denied CP, SOB, HA or pain. daughter at bedside states he is better     Medication:    MEDICATIONS  (STANDING):  atorvastatin 10 milliGRAM(s) Oral at bedtime  escitalopram 5 milliGRAM(s) Oral daily  heparin   Injectable 5000 Unit(s) SubCutaneous every 8 hours  senna 2 Tablet(s) Oral at bedtime    MEDICATIONS  (PRN):  melatonin 3 milliGRAM(s) Oral at bedtime PRN Insomnia    Vitals:    Vital Signs Last 24 Hrs  T(C): 37.3 (20 Mar 2023 06:48), Max: 37.3 (20 Mar 2023 06:48)  T(F): 99.1 (20 Mar 2023 06:48), Max: 99.1 (20 Mar 2023 06:48)  HR: 67 (20 Mar 2023 06:48) (66 - 94)  BP: 122/76 (20 Mar 2023 06:48) (122/76 - 148/80)  BP(mean): --  RR: 18 (20 Mar 2023 06:48) (18 - 18)  SpO2: 97% (20 Mar 2023 06:48) (97% - 99%)    Parameters below as of 20 Mar 2023 06:48  Patient On (Oxygen Delivery Method): room air        General Exam:   General Appearance: Appropriately dressed and in no acute distress       Head: Normocephalic, atraumatic and no dysmorphic features  Ear, Nose, and Throat: Moist mucous membranes  CVS: S1S2+  Resp: No SOB, no wheeze or rhonchi  GI: soft NT/ND  Extremities: No edema or cyanosis  Skin: No bruises or rashes     Neurological Exam:  Mental Status: Awake, alert and oriented x 2.  Able to follow simple and complex verbal commands. Able to name and repeat. stuttering speech. No obvious aphasia or dysarthria noted.   Cranial Nerves: PERRL, EOMI, VFFC, sensation V1-V3 intact,  no obvious facial asymmetry, equal elevation of palate, scm/trap 5/5, tongue is midline on protrusion. no obvious papilledema on fundoscopic exam. hearing is grossly intact.   Motor: Normal bulk, tone and strength throughout. Fine finger movements were intact and symmetric. no tremors or drift noted.    Sensation: Intact to light touch and pinprick throughout. no right/left confusion. no extinction to tactile on DSS. Romberg was negative.   Reflexes: 1+ throughout at biceps, brachioradialis, triceps, patellars and ankles bilaterally and equal. No clonus. R toe and L toe were both downgoing.  Coordination: No dysmetria on FNF or HKS  Gait: deferred   I personally reviewed the below data/images/labs:      no new labs     LIVER FUNCTIONS - ( 15 Mar 2023 12:36 )  Alb: 4.9 g/dL / Pro: 7.2 g/dL / ALK PHOS: 99 U/L / ALT: 14 U/L / AST: 22 U/L / GGT: x             Urinalysis Basic - ( 15 Mar 2023 12:36 )    Color: Yellow / Appearance: Clear / S.023 / pH: x  Gluc: x / Ketone: Trace  / Bili: Negative / Urobili: 2 mg/dL   Blood: x / Protein: 30 mg/dL / Nitrite: Negative   Leuk Esterase: Negative / RBC: 3 /hpf / WBC 2 /HPF   Sq Epi: x / Non Sq Epi: 1 /hpf / Bacteria: Negative        < from: CT Head No Cont (03.15.23 @ 14:05) >    ACC: [id number] EXAM:  CT BRAIN   ORDERED BY: ELIAN HUERTA     PROCEDURE DATE:  03/15/2023          INTERPRETATION:  CT head without IV contrast    CLINICAL INFORMATION:    ACUTE MENTAL STATUS CHANGE, CONFUSION    TECHNIQUE:  Contiguous axial 3 mm thick images were acquired.  This data   set was reconstructed in the sagittal and coronal planes.  Contrast was   not administered for this examination.    DOSE INFORMATION:   This scan was performed using automatic exposure   control (radiation dose reduction software) to obtain a diagnostic image   quality scan with patient dose as low as reasonably achievable.   Total   DLP for this examination is estimated at 852 mGy*cm.    COMPARISON:   No relevant prior examinations are available for review.    FINDINGS:    BRAIN:    The brain  demonstrates mild symmetric indistinct abnormal   diminished attenuation within the deep cerebral hemispheric white matter.    This finding is most evident in the periatrial and periventricular   posterior centrum semiovale white matter.  No cerebral cortical lesion is   recognized.   Cerebral cortical gray-white matter differentiation is   maintained.  No acute cerebral cortical infarct is seen.  No intracranial   hemorrhage is found.  No mass effect is found in the brain.    CSF SPACES:  The ventricles, sulci and basal cisterns  appear mild to   moderately dilated reflecting diffuse brain volume loss.   No   differential cerebral cortical atrophy is recognized.    Xanthogranulomatous peripherally calcifiedcystic changes noted in   lateral ventricular choroid plexus. G    HEAD AND NECK STRUCTURES:  The orbits are unremarkable.  The included   paranasal sinuses  demonstrate opacification of the left maxillary sinus.   Sclerotic wall thickening suggest this disease is long-standing. There is   mild contiguous mucosal disease within left intra-abdominal ethmoid air   cells.  The nasal cavity appears intact.  The nasopharynx is symmetric.    The central skull base is intact.  The temporal bones demonstrate patent   petrous air cells.  The calvarium appears unremarkable.      IMPRESSION:  Unremarkable head CT.   Ischemic white matter disease and   atrophy typical for age.    --- End of Report ---        
DATE OF SERVICE: 03-19-23 @ 08:02    Patient is a 73y old  Male who presents with a chief complaint of delirium, word finding (18 Mar 2023 14:58)      SUBJECTIVE / OVERNIGHT EVENTS:  slept well per daughter at bedside    MEDICATIONS  (STANDING):  atorvastatin 10 milliGRAM(s) Oral at bedtime  escitalopram 5 milliGRAM(s) Oral daily  heparin   Injectable 5000 Unit(s) SubCutaneous every 8 hours  senna 2 Tablet(s) Oral at bedtime    MEDICATIONS  (PRN):  melatonin 3 milliGRAM(s) Oral at bedtime PRN Insomnia      Vital Signs Last 24 Hrs  T(C): 36.7 (19 Mar 2023 05:27), Max: 36.9 (18 Mar 2023 17:22)  T(F): 98 (19 Mar 2023 05:27), Max: 98.4 (18 Mar 2023 17:22)  HR: 67 (19 Mar 2023 05:27) (67 - 77)  BP: 152/81 (19 Mar 2023 05:27) (143/76 - 153/78)  BP(mean): --  RR: 18 (19 Mar 2023 05:27) (18 - 18)  SpO2: 98% (19 Mar 2023 05:27) (98% - 100%)    Parameters below as of 19 Mar 2023 05:27  Patient On (Oxygen Delivery Method): room air      CAPILLARY BLOOD GLUCOSE        I&O's Summary    18 Mar 2023 07:01  -  19 Mar 2023 07:00  --------------------------------------------------------  IN: 1020 mL / OUT: 0 mL / NET: 1020 mL        PHYSICAL EXAM:  GENERAL: NAD, well-developed  HEAD:  Atraumatic, Normocephalic  EYES: EOMI, PERRLA, conjunctiva and sclera clear  NECK: Supple, No JVD  CHEST/LUNG: Clear to auscultation bilaterally; No wheeze  HEART: Regular rate and rhythm; No murmurs, rubs, or gallops  ABDOMEN: Soft, Nontender, Nondistended; Bowel sounds present  EXTREMITIES:  2+ Peripheral Pulses, No clubbing, cyanosis, or edema  PSYCH: AAOx3  NEUROLOGY: non-focal  SKIN: No rashes or lesions    LABS:                        11.3   8.30  )-----------( 240      ( 18 Mar 2023 07:38 )             34.1     03-18    141  |  106  |  25<H>  ----------------------------<  89  4.4   |  25  |  1.15    Ca    9.4      18 Mar 2023 07:38                RADIOLOGY & ADDITIONAL TESTS:    Imaging Personally Reviewed:    Consultant(s) Notes Reviewed:      Care Discussed with Consultants/Other Providers:  
DATE OF SERVICE: 23 @ 09:44    Patient is a 73y old  Male who presents with a chief complaint of delirium, word finding (16 Mar 2023 12:03)      SUBJECTIVE / OVERNIGHT EVENTS:  as per daughter pt is doing better    MEDICATIONS  (STANDING):  atorvastatin 10 milliGRAM(s) Oral at bedtime  escitalopram 5 milliGRAM(s) Oral daily  heparin   Injectable 5000 Unit(s) SubCutaneous every 8 hours    MEDICATIONS  (PRN):      Vital Signs Last 24 Hrs  T(C): 36.4 (17 Mar 2023 08:45), Max: 37.6 (17 Mar 2023 00:40)  T(F): 97.5 (17 Mar 2023 08:45), Max: 99.7 (17 Mar 2023 00:40)  HR: 76 (17 Mar 2023 08:45) (55 - 76)  BP: 175/83 (17 Mar 2023 08:45) (130/69 - 183/88)  BP(mean): --  RR: 18 (17 Mar 2023 08:45) (18 - 18)  SpO2: 99% (17 Mar 2023 08:45) (96% - 99%)    Parameters below as of 17 Mar 2023 08:45  Patient On (Oxygen Delivery Method): room air      CAPILLARY BLOOD GLUCOSE      POCT Blood Glucose.: 96 mg/dL (17 Mar 2023 08:34)  POCT Blood Glucose.: 135 mg/dL (16 Mar 2023 20:06)  POCT Blood Glucose.: 128 mg/dL (16 Mar 2023 18:06)    I&O's Summary    16 Mar 2023 07:01  -  17 Mar 2023 07:00  --------------------------------------------------------  IN: 320 mL / OUT: 0 mL / NET: 320 mL        PHYSICAL EXAM:  GENERAL: NAD, well-developed  HEAD:  Atraumatic, Normocephalic  EYES: EOMI, PERRLA, conjunctiva and sclera clear  NECK: Supple, No JVD  CHEST/LUNG: Clear to auscultation bilaterally; No wheeze  HEART: Regular rate and rhythm; No murmurs, rubs, or gallops  ABDOMEN: Soft, Nontender, Nondistended; Bowel sounds present  EXTREMITIES:  2+ Peripheral Pulses, No clubbing, cyanosis, or edema  PSYCH: AAOx3  NEUROLOGY: non-focal  SKIN: No rashes or lesions    LABS:                        11.2   7.17  )-----------( 236      ( 17 Mar 2023 07:10 )             34.7     03-    142  |  107  |  29<H>  ----------------------------<  98  4.4   |  24  |  1.21    Ca    9.6      17 Mar 2023 07:09  Phos  2.9     03-16  Mg     2.1     -16    TPro  7.2  /  Alb  4.9  /  TBili  0.8  /  DBili  x   /  AST  22  /  ALT  14  /  AlkPhos  99  03-15          Urinalysis Basic - ( 15 Mar 2023 12:36 )    Color: Yellow / Appearance: Clear / S.023 / pH: x  Gluc: x / Ketone: Trace  / Bili: Negative / Urobili: 2 mg/dL   Blood: x / Protein: 30 mg/dL / Nitrite: Negative   Leuk Esterase: Negative / RBC: 3 /hpf / WBC 2 /HPF   Sq Epi: x / Non Sq Epi: 1 /hpf / Bacteria: Negative      < from: MR Head w/wo IV Cont (23 @ 17:14) >  FINDINGS:  VENTRICLES AND SULCI:  Prominent in size compatible with age-appropriate   volume loss  INTRA-AXIAL:  Patchy areas of white matter T2 hyperintensity are seen   compatible with moderate microvascular-type changes. No diffusion   restriction is seen. A linear focus of enhancement is seen within the   left frontal lobe likely representing a developmental venous anomaly. No   mass effect or other areas of abnormal enhancement are seen.  EXTRA-AXIAL:  No mass or collection.  VISUALIZED SINUSES:  Marked left maxillary mucosal thickening with sinus   opacification. Mild to moderate mucosal thickening within additional   sinuses.  VISUALIZED MASTOIDS:  Clear.  CALVARIUM:  Normal.  CAROTID FLOW VOIDS:  Present.  MISCELLANEOUS:  There is evidence of CSF flow within the foramina of   Monro, third ventricle, aqueduct and fourth ventricle. Biphasic CSF flow   is seen within the aqueduct, fourth ventricle, basal cisternsand foramen   magnum.      IMPRESSION:  No mass effect, acute cerebral ischemia or other evidence of acute   intracranial pathology.    < end of copied text >    RADIOLOGY & ADDITIONAL TESTS:    Imaging Personally Reviewed:    Consultant(s) Notes Reviewed:      Care Discussed with Consultants/Other Providers:  
Neurology Progress Note    S: Patient seen and examined. No new events overnight. patient denied CP, SOB, HA or pain. daughter at bedside states he is better     Medication:  atorvastatin 10 milliGRAM(s) Oral at bedtime  escitalopram 5 milliGRAM(s) Oral daily  heparin   Injectable 5000 Unit(s) SubCutaneous every 8 hours  sodium chloride 0.9%. 1000 milliLiter(s) IV Continuous <Continuous>      Vitals:  Vital Signs Last 24 Hrs  T(C): 36.4 (17 Mar 2023 08:45), Max: 37.6 (17 Mar 2023 00:40)  T(F): 97.5 (17 Mar 2023 08:45), Max: 99.7 (17 Mar 2023 00:40)  HR: 76 (17 Mar 2023 08:45) (55 - 76)  BP: 175/83 (17 Mar 2023 08:45) (130/69 - 183/88)  BP(mean): --  RR: 18 (17 Mar 2023 08:45) (18 - 18)  SpO2: 99% (17 Mar 2023 08:45) (96% - 99%)    Parameters below as of 17 Mar 2023 08:45  Patient On (Oxygen Delivery Method): room air        General Exam:   General Appearance: Appropriately dressed and in no acute distress       Head: Normocephalic, atraumatic and no dysmorphic features  Ear, Nose, and Throat: Moist mucous membranes  CVS: S1S2+  Resp: No SOB, no wheeze or rhonchi  GI: soft NT/ND  Extremities: No edema or cyanosis  Skin: No bruises or rashes     Neurological Exam:  Mental Status: Awake, alert and oriented x 2.  Able to follow simple and complex verbal commands. Able to name and repeat. stuttering speech. No obvious aphasia or dysarthria noted.   Cranial Nerves: PERRL, EOMI, VFFC, sensation V1-V3 intact,  no obvious facial asymmetry, equal elevation of palate, scm/trap 5/5, tongue is midline on protrusion. no obvious papilledema on fundoscopic exam. hearing is grossly intact.   Motor: Normal bulk, tone and strength throughout. Fine finger movements were intact and symmetric. no tremors or drift noted.    Sensation: Intact to light touch and pinprick throughout. no right/left confusion. no extinction to tactile on DSS. Romberg was negative.   Reflexes: 1+ throughout at biceps, brachioradialis, triceps, patellars and ankles bilaterally and equal. No clonus. R toe and L toe were both downgoing.  Coordination: No dysmetria on FNF or HKS  Gait: deferred   I personally reviewed the below data/images/labs:      CBC Full  -  ( 17 Mar 2023 07:10 )  WBC Count : 7.17 K/uL  RBC Count : 3.73 M/uL  Hemoglobin : 11.2 g/dL  Hematocrit : 34.7 %  Platelet Count - Automated : 236 K/uL  Mean Cell Volume : 93.0 fl  Mean Cell Hemoglobin : 30.0 pg  Mean Cell Hemoglobin Concentration : 32.3 gm/dL  Auto Neutrophil # : x  Auto Lymphocyte # : x  Auto Monocyte # : x  Auto Eosinophil # : x  Auto Basophil # : x  Auto Neutrophil % : x  Auto Lymphocyte % : x  Auto Monocyte % : x  Auto Eosinophil % : x  Auto Basophil % : x    -    142  |  107  |  29<H>  ----------------------------<  98  4.4   |  24  |  1.21    Ca    9.6      17 Mar 2023 07:09  Phos  2.9     03-16  Mg     2.1     03-16    TPro  7.2  /  Alb  4.9  /  TBili  0.8  /  DBili  x   /  AST  22  /  ALT  14  /  AlkPhos  99  03-15    LIVER FUNCTIONS - ( 15 Mar 2023 12:36 )  Alb: 4.9 g/dL / Pro: 7.2 g/dL / ALK PHOS: 99 U/L / ALT: 14 U/L / AST: 22 U/L / GGT: x             Urinalysis Basic - ( 15 Mar 2023 12:36 )    Color: Yellow / Appearance: Clear / S.023 / pH: x  Gluc: x / Ketone: Trace  / Bili: Negative / Urobili: 2 mg/dL   Blood: x / Protein: 30 mg/dL / Nitrite: Negative   Leuk Esterase: Negative / RBC: 3 /hpf / WBC 2 /HPF   Sq Epi: x / Non Sq Epi: 1 /hpf / Bacteria: Negative        < from: CT Head No Cont (03.15.23 @ 14:05) >    ACC: [id number] EXAM:  CT BRAIN   ORDERED BY: ELIAN HUERTA     PROCEDURE DATE:  03/15/2023          INTERPRETATION:  CT head without IV contrast    CLINICAL INFORMATION:    ACUTE MENTAL STATUS CHANGE, CONFUSION    TECHNIQUE:  Contiguous axial 3 mm thick images were acquired.  This data   set was reconstructed in the sagittal and coronal planes.  Contrast was   not administered for this examination.    DOSE INFORMATION:   This scan was performed using automatic exposure   control (radiation dose reduction software) to obtain a diagnostic image   quality scan with patient dose as low as reasonably achievable.   Total   DLP for this examination is estimated at 852 mGy*cm.    COMPARISON:   No relevant prior examinations are available for review.    FINDINGS:    BRAIN:    The brain  demonstrates mild symmetric indistinct abnormal   diminished attenuation within the deep cerebral hemispheric white matter.    This finding is most evident in the periatrial and periventricular   posterior centrum semiovale white matter.  No cerebral cortical lesion is   recognized.   Cerebral cortical gray-white matter differentiation is   maintained.  No acute cerebral cortical infarct is seen.  No intracranial   hemorrhage is found.  No mass effect is found in the brain.    CSF SPACES:  The ventricles, sulci and basal cisterns  appear mild to   moderately dilated reflecting diffuse brain volume loss.   No   differential cerebral cortical atrophy is recognized.    Xanthogranulomatous peripherally calcifiedcystic changes noted in   lateral ventricular choroid plexus. G    HEAD AND NECK STRUCTURES:  The orbits are unremarkable.  The included   paranasal sinuses  demonstrate opacification of the left maxillary sinus.   Sclerotic wall thickening suggest this disease is long-standing. There is   mild contiguous mucosal disease within left intra-abdominal ethmoid air   cells.  The nasal cavity appears intact.  The nasopharynx is symmetric.    The central skull base is intact.  The temporal bones demonstrate patent   petrous air cells.  The calvarium appears unremarkable.      IMPRESSION:  Unremarkable head CT.   Ischemic white matter disease and   atrophy typical for age.    --- End of Report ---        
DATE OF SERVICE: 23 @ 11:07    Patient is a 73y old  Male who presents with a chief complaint of delirium, word finding (15 Mar 2023 16:46)      SUBJECTIVE / OVERNIGHT EVENTS:  did not sleep well    MEDICATIONS  (STANDING):  atorvastatin 10 milliGRAM(s) Oral at bedtime  escitalopram 10 milliGRAM(s) Oral daily  heparin   Injectable 5000 Unit(s) SubCutaneous every 8 hours    MEDICATIONS  (PRN):      Vital Signs Last 24 Hrs  T(C): 37.3 (16 Mar 2023 06:00), Max: 37.3 (16 Mar 2023 06:00)  T(F): 99.1 (16 Mar 2023 06:00), Max: 99.1 (16 Mar 2023 06:00)  HR: 79 (16 Mar 2023 08:54) (78 - 95)  BP: 148/75 (16 Mar 2023 08:54) (112/70 - 181/94)  BP(mean): 97 (15 Mar 2023 16:25) (97 - 120)  RR: 18 (16 Mar 2023 06:00) (16 - 18)  SpO2: 97% (16 Mar 2023 06:00) (97% - 100%)    Parameters below as of 16 Mar 2023 06:00  Patient On (Oxygen Delivery Method): room air      CAPILLARY BLOOD GLUCOSE        I&O's Summary    15 Mar 2023 07:01  -  16 Mar 2023 07:00  --------------------------------------------------------  IN: 360 mL / OUT: 0 mL / NET: 360 mL        PHYSICAL EXAM:  GENERAL: NAD, well-developed  HEAD:  Atraumatic, Normocephalic  EYES: EOMI, PERRLA, conjunctiva and sclera clear  NECK: Supple, No JVD  CHEST/LUNG: Clear to auscultation bilaterally; No wheeze  HEART: Regular rate and rhythm; No murmurs, rubs, or gallops  ABDOMEN: Soft, Nontender, Nondistended; Bowel sounds present  EXTREMITIES:  2+ Peripheral Pulses, No clubbing, cyanosis, or edema  PSYCH: AAOx1  NEUROLOGY: non-focal  SKIN: No rashes or lesions    LABS:                        10.6   7.33  )-----------( 246      ( 16 Mar 2023 07:15 )             33.9     03-16    143  |  109<H>  |  29<H>  ----------------------------<  93  4.2   |  23  |  1.23    Ca    9.6      16 Mar 2023 07:17  Phos  2.9     03-16  Mg     2.1     03-16    TPro  7.2  /  Alb  4.9  /  TBili  0.8  /  DBili  x   /  AST  22  /  ALT  14  /  AlkPhos  99  03-15          Urinalysis Basic - ( 15 Mar 2023 12:36 )    Color: Yellow / Appearance: Clear / S.023 / pH: x  Gluc: x / Ketone: Trace  / Bili: Negative / Urobili: 2 mg/dL   Blood: x / Protein: 30 mg/dL / Nitrite: Negative   Leuk Esterase: Negative / RBC: 3 /hpf / WBC 2 /HPF   Sq Epi: x / Non Sq Epi: 1 /hpf / Bacteria: Negative        RADIOLOGY & ADDITIONAL TESTS:    Imaging Personally Reviewed:    Consultant(s) Notes Reviewed:      Care Discussed with Consultants/Other Providers:  
DATE OF SERVICE: 03-18-23 @ 13:39    Patient is a 73y old  Male who presents with a chief complaint of delirium, word finding (17 Mar 2023 09:44)      SUBJECTIVE / OVERNIGHT EVENTS:  did not sleep last night.  family want to take him home    MEDICATIONS  (STANDING):  atorvastatin 10 milliGRAM(s) Oral at bedtime  escitalopram 5 milliGRAM(s) Oral daily  heparin   Injectable 5000 Unit(s) SubCutaneous every 8 hours  senna 2 Tablet(s) Oral at bedtime  sodium chloride 0.9%. 1000 milliLiter(s) (75 mL/Hr) IV Continuous <Continuous>    MEDICATIONS  (PRN):      Vital Signs Last 24 Hrs  T(C): 36.7 (18 Mar 2023 12:29), Max: 37.1 (17 Mar 2023 16:28)  T(F): 98 (18 Mar 2023 12:29), Max: 98.8 (17 Mar 2023 16:28)  HR: 77 (18 Mar 2023 12:29) (57 - 77)  BP: 147/88 (18 Mar 2023 12:29) (137/73 - 160/78)  BP(mean): --  RR: 18 (18 Mar 2023 12:29) (18 - 18)  SpO2: 100% (18 Mar 2023 12:29) (97% - 100%)    Parameters below as of 18 Mar 2023 12:29  Patient On (Oxygen Delivery Method): room air      CAPILLARY BLOOD GLUCOSE        I&O's Summary    17 Mar 2023 07:01  -  18 Mar 2023 07:00  --------------------------------------------------------  IN: 2040 mL / OUT: 200 mL / NET: 1840 mL        PHYSICAL EXAM:  GENERAL: NAD, well-developed  HEAD:  Atraumatic, Normocephalic  EYES: EOMI, PERRLA, conjunctiva and sclera clear  NECK: Supple, No JVD  CHEST/LUNG: Clear to auscultation bilaterally; No wheeze  HEART: Regular rate and rhythm; No murmurs, rubs, or gallops  ABDOMEN: Soft, Nontender, Nondistended; Bowel sounds present  EXTREMITIES:  2+ Peripheral Pulses, No clubbing, cyanosis, or edema  PSYCH: AAOx3  NEUROLOGY: non-focal  SKIN: No rashes or lesions    LABS:                        11.3   8.30  )-----------( 240      ( 18 Mar 2023 07:38 )             34.1     03-18    141  |  106  |  25<H>  ----------------------------<  89  4.4   |  25  |  1.15    Ca    9.4      18 Mar 2023 07:38    < from: MR Head w/wo IV Cont (03.16.23 @ 17:14) >  FINDINGS:  VENTRICLES AND SULCI:  Prominent in size compatible with age-appropriate   volume loss  INTRA-AXIAL:  Patchy areas of white matter T2 hyperintensity are seen   compatible with moderate microvascular-type changes. No diffusion   restriction is seen. A linear focus of enhancement is seen within the   left frontal lobe likely representing a developmental venous anomaly. No   mass effect or other areas of abnormal enhancement are seen.  EXTRA-AXIAL:  No mass or collection.  VISUALIZED SINUSES:  Marked left maxillary mucosal thickening with sinus   opacification. Mild to moderate mucosal thickening within additional   sinuses.  VISUALIZED MASTOIDS:  Clear.  CALVARIUM:  Normal.  CAROTID FLOW VOIDS:  Present.  MISCELLANEOUS:  There is evidence of CSF flow within the foramina of   [place], third ventricle, aqueduct and fourth ventricle. Biphasic CSF flow   is seen within the aqueduct, fourth ventricle, basal cisternsand foramen   magnum.      IMPRESSION:  No mass effect, acute cerebral ischemia or other evidence of acute   intracranial pathology.    CSF flow as described above.    < end of copied text >              RADIOLOGY & ADDITIONAL TESTS:    Imaging Personally Reviewed:    Consultant(s) Notes Reviewed:      Care Discussed with Consultants/Other Providers:  
DATE OF SERVICE: 03-20-23 @ 12:54    Patient is a 73y old  Male who presents with a chief complaint of delirium, word finding (20 Mar 2023 08:29)      SUBJECTIVE / OVERNIGHT EVENTS:  confused.  daughter at bedside.  want to go home    MEDICATIONS  (STANDING):  atorvastatin 10 milliGRAM(s) Oral at bedtime  escitalopram 5 milliGRAM(s) Oral daily  heparin   Injectable 5000 Unit(s) SubCutaneous every 8 hours  senna 2 Tablet(s) Oral at bedtime    MEDICATIONS  (PRN):  melatonin 3 milliGRAM(s) Oral at bedtime PRN Insomnia      Vital Signs Last 24 Hrs  T(C): 36.6 (20 Mar 2023 12:11), Max: 37.3 (20 Mar 2023 06:48)  T(F): 97.8 (20 Mar 2023 12:11), Max: 99.1 (20 Mar 2023 06:48)  HR: 71 (20 Mar 2023 12:11) (66 - 71)  BP: 148/80 (20 Mar 2023 12:11) (122/76 - 148/80)  BP(mean): --  RR: 18 (20 Mar 2023 12:11) (18 - 18)  SpO2: 100% (20 Mar 2023 12:11) (97% - 100%)    Parameters below as of 20 Mar 2023 12:11  Patient On (Oxygen Delivery Method): room air      CAPILLARY BLOOD GLUCOSE        I&O's Summary    19 Mar 2023 07:01  -  20 Mar 2023 07:00  --------------------------------------------------------  IN: 540 mL / OUT: 200 mL / NET: 340 mL        PHYSICAL EXAM:  GENERAL: NAD, well-developed  HEAD:  Atraumatic, Normocephalic  EYES: EOMI, PERRLA, conjunctiva and sclera clear  NECK: Supple, No JVD  CHEST/LUNG: Clear to auscultation bilaterally; No wheeze  HEART: Regular rate and rhythm; No murmurs, rubs, or gallops  ABDOMEN: Soft, Nontender, Nondistended; Bowel sounds present  EXTREMITIES:  2+ Peripheral Pulses, No clubbing, cyanosis, or edema  PSYCH: AAOx3  NEUROLOGY: non-focal  SKIN: No rashes or lesions    LABS:      < from: MR Angio Head w/wo IV Cont (03.19.23 @ 21:02) >  FINDINGS:    The ANTERIOR circulation demonstrates intact inflow from the ascending   cervical segment to the petrous segment of each internal carotid artery.   The cavernous and clinoid segments appear intact.   The ophthalmic   arteries are demonstrated as patent vessels on each side.    The anterior   cerebral arteries are patent and symmetric.  An anterior communicating   artery is present.  The right middle cerebral artery demonstrates an   intact initial M1 segment and patentperipheral anterior and posterior   division sylvian branches.  The left middle cerebral artery demonstrates   an intact initial M1 segment and patent peripheral anterior and posterior   division sylvian branches.    The POSTERIOR circulation demonstrates intact inflow from each vertebral   artery.   The left vertebral artery is dominant caliber.    PICA arteries   are patent on each side. The left PICA origin is not fully included. The   basilar artery appears intact.  Superior cerebellar arteries are   demonstrated.  Posterior communicating arteries are demonstrated on each   side.   Each posterior cerebral artery is patent to peripheral branching.    There appears to be a small developmental venous anomaly in the left   frontal opercular region. No anomalous vessel termination, intracranial   aneurysm or arteriovenous malformation  is recognized.  Note that small   intracranial aneurysms less than 0.5 cm may not be detected by this   technique.      IMPRESSION:    1. ANTERIOR CIRCULATION:   Intact.    2. POSTERIOR CIRCULATION:  Intact.    < end of copied text >                RADIOLOGY & ADDITIONAL TESTS:    Imaging Personally Reviewed:    Consultant(s) Notes Reviewed:      Care Discussed with Consultants/Other Providers:

## 2023-03-20 NOTE — DISCHARGE NOTE PROVIDER - CARE PROVIDERS DIRECT ADDRESSES
,DirectAddress_Unknown ,DirectAddress_Unknown,[email or web address],DirectAddress_Unknown ,DirectAddress_Unknown,[email or web address],DirectAddress_Unknown,DirectAddress_Unknown

## 2023-03-20 NOTE — PROGRESS NOTE ADULT - REASON FOR ADMISSION
delirium, word finding

## 2023-03-20 NOTE — PROGRESS NOTE ADULT - ASSESSMENT
72 yo M PMHx HLD, elevated PSA, anxiety/depression on Lexapro, orthostatic hypotension on Midodrine, presents to the ED for delusions & AMS x3 weeks. 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 Last night, per daughter, his delusions were the worst it has been and was unable to be re-oriented. Currently, pt is back to his baseline. Pt also having urinary frequency without dysuria. Of note, pt was tx'd with amoxicillin for a dental infection 3 weeks ago. Pt reportedly fell last week and hit his head. he is eating and drinking normally. he denies pain anywhere except his right neck, which pt has had for a while. he denies cp, sob, abd pain, n/v/d, cough, palpitations.    delirium  - likely progressing dementia  - b12, folate, tsh are normal  - brain mri done  - EEG done  - neurology consult appreciated    venous anomaly on MRI  - NS consult noted  - MRA with contrast done    dementia  - psych consult appreciated  - decrease lexapro    orthostatic hypotension  - s/p iv fluids  - may be related to autonomic dysfunction  - hold midodrine    depression  - c/w lexapro  - psych eval    insomnia  - melatonin 3 mg qhs     HLD  - c/w lipitor    dvt px   - heparin    can be discharged when cleared by psych and neurology

## 2023-03-20 NOTE — BH CONSULTATION LIAISON PROGRESS NOTE - NSBHATTESTBILLING_PSY_A_CORE
22773-Bdtyxlcxzp OBS or IP - moderate complexity OR 35-49 mins
89296-Tkkizfhnll OBS or IP - moderate complexity OR 35-49 mins
88447-Wsylhxxfqg OBS or IP - moderate complexity OR 35-49 mins

## 2023-03-20 NOTE — BH CONSULTATION LIAISON PROGRESS NOTE - NSBHCONSULTFOLLOWAFTERCARE_PSY_A_CORE FT
Family to call me prn. 
Follow up with his outpatient neurologist  [facility] outpatient psychiatry ([phone]) 
I gave the pt's wife my card for follow up as outpatient

## 2023-03-22 PROBLEM — E78.5 HYPERLIPIDEMIA, UNSPECIFIED: Chronic | Status: ACTIVE | Noted: 2023-03-16

## 2023-03-22 PROBLEM — R97.20 ELEVATED PROSTATE SPECIFIC ANTIGEN [PSA]: Chronic | Status: ACTIVE | Noted: 2023-03-16

## 2023-03-22 PROBLEM — I95.1 ORTHOSTATIC HYPOTENSION: Chronic | Status: ACTIVE | Noted: 2023-03-16

## 2023-04-26 ENCOUNTER — APPOINTMENT (OUTPATIENT)
Dept: HEART AND VASCULAR | Facility: CLINIC | Age: 73
End: 2023-04-26

## 2023-06-13 ENCOUNTER — APPOINTMENT (OUTPATIENT)
Dept: UROLOGY | Facility: CLINIC | Age: 73
End: 2023-06-13
Payer: MEDICARE

## 2023-06-13 DIAGNOSIS — N13.8 BENIGN PROSTATIC HYPERPLASIA WITH LOWER URINARY TRACT SYMPMS: ICD-10-CM

## 2023-06-13 DIAGNOSIS — N40.1 BENIGN PROSTATIC HYPERPLASIA WITH LOWER URINARY TRACT SYMPMS: ICD-10-CM

## 2023-06-13 DIAGNOSIS — R97.20 ELEVATED PROSTATE, SPECIFIC ANTIGEN [PSA]: ICD-10-CM

## 2023-06-13 PROCEDURE — 99214 OFFICE O/P EST MOD 30 MIN: CPT

## 2023-06-14 LAB
APPEARANCE: CLEAR
BACTERIA: NEGATIVE /HPF
BILIRUBIN URINE: NEGATIVE
BLOOD URINE: NEGATIVE
CAST: 12 /LPF
COLOR: YELLOW
EPITHELIAL CELLS: 1 /HPF
GLUCOSE QUALITATIVE U: 100 MG/DL
HYALINE CASTS: PRESENT
KETONES URINE: ABNORMAL MG/DL
LEUKOCYTE ESTERASE URINE: NEGATIVE
MICROSCOPIC-UA: NORMAL
NITRITE URINE: NEGATIVE
PH URINE: 5.5
PROTEIN URINE: 30 MG/DL
PSA FREE FLD-MCNC: 15 %
PSA FREE SERPL-MCNC: 0.78 NG/ML
PSA SERPL-MCNC: 5.19 NG/ML
RED BLOOD CELLS URINE: 0 /HPF
REVIEW: NORMAL
SPECIFIC GRAVITY URINE: 1.02
UROBILINOGEN URINE: 1 MG/DL
WHITE BLOOD CELLS URINE: 3 /HPF

## 2023-07-03 NOTE — ED ADULT NURSE NOTE - CAS TRG GENERAL NORM CIRC DET
High Dose Vitamin A Counseling: Side effects reviewed, pt to contact office should one occur. Strong peripheral pulses

## 2023-07-18 ENCOUNTER — APPOINTMENT (OUTPATIENT)
Dept: NEUROLOGY | Facility: CLINIC | Age: 73
End: 2023-07-18

## 2023-08-01 ENCOUNTER — APPOINTMENT (OUTPATIENT)
Dept: NEUROLOGY | Facility: CLINIC | Age: 73
End: 2023-08-01
Payer: MEDICARE

## 2023-08-01 VITALS
BODY MASS INDEX: 23.3 KG/M2 | DIASTOLIC BLOOD PRESSURE: 74 MMHG | HEIGHT: 72 IN | SYSTOLIC BLOOD PRESSURE: 145 MMHG | WEIGHT: 172 LBS | HEART RATE: 63 BPM

## 2023-08-01 PROCEDURE — 99205 OFFICE O/P NEW HI 60 MIN: CPT

## 2023-08-01 RX ORDER — MIDODRINE HYDROCHLORIDE 10 MG/1
10 TABLET ORAL 3 TIMES DAILY
Refills: 0 | Status: ACTIVE | COMMUNITY
Start: 2023-08-01

## 2023-08-01 RX ORDER — TADALAFIL 5 MG/1
5 TABLET ORAL
Qty: 30 | Refills: 3 | Status: DISCONTINUED | COMMUNITY
Start: 2021-06-11 | End: 2023-08-01

## 2023-08-01 RX ORDER — MULTIVIT-MIN/FOLIC/VIT K/LYCOP 400-300MCG
50 MCG TABLET ORAL DAILY
Refills: 0 | Status: ACTIVE | COMMUNITY
Start: 2023-08-01

## 2023-08-01 RX ORDER — FINASTERIDE 5 MG/1
5 TABLET, FILM COATED ORAL
Qty: 90 | Refills: 3 | Status: DISCONTINUED | COMMUNITY
Start: 2023-06-13 | End: 2023-08-01

## 2023-08-01 RX ORDER — RIVASTIGMINE 9.5 MG/24H
9.5 PATCH, EXTENDED RELEASE TRANSDERMAL DAILY
Refills: 0 | Status: DISCONTINUED | COMMUNITY
Start: 2023-08-01 | End: 2023-08-01

## 2023-08-01 RX ORDER — TADALAFIL 20 MG/1
20 TABLET ORAL
Qty: 12 | Refills: 6 | Status: DISCONTINUED | COMMUNITY
Start: 2021-06-11 | End: 2023-08-01

## 2023-08-01 NOTE — ASSESSMENT
[FreeTextEntry1] : Assessment: 72yo RH WN with ongoing cognitive decline. By PMH, imaging and progression, LBD. Exam limited. Minimal parkinsonism.  Diagnostic Impression: -LBD -initial parkinsonism  Plan: -change patch to pills, due to rash -PD @ [facility] I recommended to pursue mental and physical activity and to adhere to Mediterranean type of diet.

## 2023-08-01 NOTE — HISTORY OF PRESENT ILLNESS
[FreeTextEntry1] : COVID asymptomatic, 2020. COVID VACCINE FULL.  HPI: 74yo RH WM with progressive cognitive decline, prior seen by Dr. Saunders.  PMH: since , change in behavior, followed by some cognitive decline, NOS. Seen by Chip and another Neurologist in the past.  Starting in , with WFD, garbled speech (report of phonemic issues), "strange behaviors" and paranoid ideation, with progression, also in relation to hospitalization for infections, with more comprehensive cognitive decline. Last NPT in  with deficits in all domains, but particularly, memory, VS, fluency and phonemic ability.  Significant fluctuations and sensitivity to meds.  Lately progression with RBSD, VH and orthostatic hypotension.  -Sleep: RBSD, no naps in the day  -Appetite: ok, no changes.  -Motor symptoms:   -B/B: ok  -Psychiatric symptoms: VH, RBSD, paranoid delusions  -Functional status: Lott Index of Foard in Activities of Daily Livin. Bathing/Showerin 2. Dressin 3. Toiletin 4. Transferrin 5. Continence: 1 6: Feedin  TOTAL: 6  Lincoln-Bernardo Instrumental Activities of Daily Living: A. Ability to Use Telephone: 1 B. Shoppin C. Food Preparation: 0 D. Housekeepin E. Laundry: 0 F. Transportation: 0 - stopped driving after 3/2023, [facility] stay for infection G. Responsibility for Own Medications: 0 H: Ability to Handle Finances: 0  TOTAL: 1  CDR: 1.5  Started on Exelon patch, a bit better now, on 9.5mg.  -Professional status: [employer], [employer], retired  PCP and other physicians: -PCP: Juno -Neuro: Chip  Workup done: -MRI : benign -FDG-PET : c/w LBD.

## 2023-08-01 NOTE — REASON FOR VISIT
[Initial Evaluation] : an initial evaluation [Spouse] : spouse [Family Member] : family member [FreeTextEntry1] : Cognitive decline

## 2023-08-01 NOTE — PHYSICAL EXAM
[General Appearance - Alert] : alert [General Appearance - In No Acute Distress] : in no acute distress [Impaired Insight] : insight and judgment were intact [Affect] : the affect was normal [Person] : oriented to person [Place] : oriented to place [Short Term Intact] : short term memory intact [Concentration Intact] : normal concentrating ability [Visual Intact] : visual attention was ~T not ~L decreased [Naming Objects] : no difficulty naming common objects [Repeating Phrases] : no difficulty repeating a phrase [Writing A Sentence] : no difficulty writing a sentence [Fluency] : fluency intact [Comprehension] : comprehension intact [Reading] : reading intact [Past History] : adequate knowledge of personal past history [Cranial Nerves Optic (II)] : visual acuity intact bilaterally,  visual fields full to confrontation, pupils equal round and reactive to light [Cranial Nerves Oculomotor (III)] : extraocular motion intact [Cranial Nerves Trigeminal (V)] : facial sensation intact symmetrically [Cranial Nerves Facial (VII)] : face symmetrical [Cranial Nerves Vestibulocochlear (VIII)] : hearing was intact bilaterally [Cranial Nerves Glossopharyngeal (IX)] : tongue and palate midline [Cranial Nerves Accessory (XI - Cranial And Spinal)] : head turning and shoulder shrug symmetric [Cranial Nerves Hypoglossal (XII)] : there was no tongue deviation with protrusion [Motor Strength] : muscle strength was normal in all four extremities [Involuntary Movements] : no involuntary movements were seen [No Muscle Atrophy] : normal bulk in all four extremities [Motor Handedness Right-Handed] : the patient is right hand dominant [Motor Strength Upper Extremities Bilaterally] : strength was normal in both upper extremities [Sensation Tactile Decrease] : light touch was intact [Motor Strength Lower Extremities Bilaterally] : strength was normal in both lower extremities [Romberg's Sign] : Romberg's sign was negtive [Balance] : balance was intact [Past-pointing] : there was no past-pointing [Tremor] : no tremor present [2+] : Ankle jerk left 2+ [Plantar Reflex Right Only] : normal on the right [Plantar Reflex Left Only] : normal on the left [FreeTextEntry4] : Exam limited.  Pt with significant frustration when tested. Pentagons: 1 pentagon and 1 square, no intersection Alt Pattern: tracing Spiral: ok Clock: no, perseverates trying to draw the alt pattern. [FreeTextEntry6] : diffuse paratonia UE and trunk, cogwheel L>R [FreeTextEntry8] : reduced swing LUE;  [Sclera] : the sclera and conjunctiva were normal [PERRL With Normal Accommodation] : pupils were equal in size, round, reactive to light, with normal accommodation [Extraocular Movements] : extraocular movements were intact [No APD] : no afferent pupillary defect [No DINO] : no internuclear ophthalmoplegia [Full Visual Field] : full visual field [Outer Ear] : the ears and nose were normal in appearance [Neck Appearance] : the appearance of the neck was normal [] : no respiratory distress [Edema] : there was no peripheral edema [FreeTextEntry1] : rash on site of Exelon patch, with pustules.

## 2023-08-01 NOTE — DATA REVIEWED
[de-identified] : I reviewed MRI and MRA in PACS. Also, reports of PET and MRI from 2021. I compared MRI 2021 (SURESH Saunders) with the 2023 @ [facility]: progession of atrophy, more posterior and subcortical.

## 2023-09-18 ENCOUNTER — APPOINTMENT (OUTPATIENT)
Dept: NEUROLOGY | Facility: CLINIC | Age: 73
End: 2023-09-18

## 2023-09-30 ENCOUNTER — TRANSCRIPTION ENCOUNTER (OUTPATIENT)
Age: 73
End: 2023-09-30

## 2023-10-02 ENCOUNTER — TRANSCRIPTION ENCOUNTER (OUTPATIENT)
Age: 73
End: 2023-10-02

## 2023-10-03 ENCOUNTER — TRANSCRIPTION ENCOUNTER (OUTPATIENT)
Age: 73
End: 2023-10-03

## 2023-10-16 ENCOUNTER — TRANSCRIPTION ENCOUNTER (OUTPATIENT)
Age: 73
End: 2023-10-16

## 2023-10-31 ENCOUNTER — APPOINTMENT (OUTPATIENT)
Dept: NEUROLOGY | Facility: CLINIC | Age: 73
End: 2023-10-31
Payer: MEDICARE

## 2023-10-31 VITALS — HEART RATE: 68 BPM | DIASTOLIC BLOOD PRESSURE: 74 MMHG | SYSTOLIC BLOOD PRESSURE: 122 MMHG

## 2023-10-31 PROCEDURE — 99214 OFFICE O/P EST MOD 30 MIN: CPT

## 2023-11-14 ENCOUNTER — TRANSCRIPTION ENCOUNTER (OUTPATIENT)
Age: 73
End: 2023-11-14

## 2023-11-14 RX ORDER — ESCITALOPRAM OXALATE 5 MG/1
5 TABLET ORAL DAILY
Qty: 30 | Refills: 0 | Status: DISCONTINUED | COMMUNITY
Start: 2023-08-01 | End: 2023-11-14

## 2023-11-27 ENCOUNTER — TRANSCRIPTION ENCOUNTER (OUTPATIENT)
Age: 73
End: 2023-11-27

## 2023-12-11 ENCOUNTER — TRANSCRIPTION ENCOUNTER (OUTPATIENT)
Age: 73
End: 2023-12-11

## 2023-12-12 ENCOUNTER — APPOINTMENT (OUTPATIENT)
Dept: GERIATRICS | Facility: CLINIC | Age: 73
End: 2023-12-12
Payer: MEDICARE

## 2023-12-12 DIAGNOSIS — Z74.1 NEED FOR ASSISTANCE WITH PERSONAL CARE: ICD-10-CM

## 2023-12-12 DIAGNOSIS — F51.8 OTHER SLEEP DISORDERS NOT DUE TO A SUBSTANCE OR KNOWN PHYSIOLOGICAL CONDITION: ICD-10-CM

## 2023-12-12 DIAGNOSIS — Z78.9 OTHER SPECIFIED HEALTH STATUS: ICD-10-CM

## 2023-12-12 DIAGNOSIS — I95.1 ORTHOSTATIC HYPOTENSION: ICD-10-CM

## 2023-12-12 DIAGNOSIS — Z82.0 FAMILY HISTORY OF EPILEPSY AND OTHER DISEASES OF THE NERVOUS SYSTEM: ICD-10-CM

## 2023-12-12 PROCEDURE — 99205 OFFICE O/P NEW HI 60 MIN: CPT | Mod: 95

## 2024-01-02 ENCOUNTER — APPOINTMENT (OUTPATIENT)
Dept: UROLOGY | Facility: CLINIC | Age: 74
End: 2024-01-02
Payer: MEDICARE

## 2024-01-02 DIAGNOSIS — C61 MALIGNANT NEOPLASM OF PROSTATE: ICD-10-CM

## 2024-01-02 PROCEDURE — 99214 OFFICE O/P EST MOD 30 MIN: CPT

## 2024-01-02 PROCEDURE — G2211 COMPLEX E/M VISIT ADD ON: CPT

## 2024-01-03 LAB
PSA FREE FLD-MCNC: 17 %
PSA FREE SERPL-MCNC: 1.24 NG/ML
PSA SERPL-MCNC: 7.3 NG/ML

## 2024-01-08 ENCOUNTER — TRANSCRIPTION ENCOUNTER (OUTPATIENT)
Age: 74
End: 2024-01-08

## 2024-01-25 ENCOUNTER — APPOINTMENT (OUTPATIENT)
Dept: GERIATRICS | Facility: CLINIC | Age: 74
End: 2024-01-25

## 2024-01-31 ENCOUNTER — TRANSCRIPTION ENCOUNTER (OUTPATIENT)
Age: 74
End: 2024-01-31

## 2024-02-01 ENCOUNTER — TRANSCRIPTION ENCOUNTER (OUTPATIENT)
Age: 74
End: 2024-02-01

## 2024-02-02 ENCOUNTER — TRANSCRIPTION ENCOUNTER (OUTPATIENT)
Age: 74
End: 2024-02-02

## 2024-02-05 ENCOUNTER — TRANSCRIPTION ENCOUNTER (OUTPATIENT)
Age: 74
End: 2024-02-05

## 2024-02-24 ENCOUNTER — TRANSCRIPTION ENCOUNTER (OUTPATIENT)
Age: 74
End: 2024-02-24

## 2024-02-29 ENCOUNTER — APPOINTMENT (OUTPATIENT)
Dept: GERIATRICS | Facility: CLINIC | Age: 74
End: 2024-02-29

## 2024-03-04 ENCOUNTER — TRANSCRIPTION ENCOUNTER (OUTPATIENT)
Age: 74
End: 2024-03-04

## 2024-03-04 RX ORDER — DIVALPROEX SODIUM 125 MG/1
125 TABLET, DELAYED RELEASE ORAL
Qty: 180 | Refills: 3 | Status: DISCONTINUED | COMMUNITY
Start: 2023-10-02 | End: 2024-03-04

## 2024-03-05 ENCOUNTER — TRANSCRIPTION ENCOUNTER (OUTPATIENT)
Age: 74
End: 2024-03-05

## 2024-03-05 DIAGNOSIS — R45.1 RESTLESSNESS AND AGITATION: ICD-10-CM

## 2024-03-27 ENCOUNTER — TRANSCRIPTION ENCOUNTER (OUTPATIENT)
Age: 74
End: 2024-03-27

## 2024-04-04 ENCOUNTER — TRANSCRIPTION ENCOUNTER (OUTPATIENT)
Age: 74
End: 2024-04-04

## 2024-04-04 DIAGNOSIS — R45.1 RESTLESSNESS AND AGITATION: ICD-10-CM

## 2024-04-04 RX ORDER — OLANZAPINE 2.5 MG/1
2.5 TABLET, FILM COATED ORAL
Qty: 60 | Refills: 2 | Status: DISCONTINUED | COMMUNITY
Start: 2024-03-05 | End: 2024-04-04

## 2024-04-12 ENCOUNTER — TRANSCRIPTION ENCOUNTER (OUTPATIENT)
Age: 74
End: 2024-04-12

## 2024-04-30 ENCOUNTER — TRANSCRIPTION ENCOUNTER (OUTPATIENT)
Age: 74
End: 2024-04-30

## 2024-04-30 DIAGNOSIS — R13.10 DYSPHAGIA, UNSPECIFIED: ICD-10-CM

## 2024-05-02 ENCOUNTER — TRANSCRIPTION ENCOUNTER (OUTPATIENT)
Age: 74
End: 2024-05-02

## 2024-05-10 DIAGNOSIS — H53.9 UNSPECIFIED VISUAL DISTURBANCE: ICD-10-CM

## 2024-05-10 DIAGNOSIS — R41.89 OTHER SYMPTOMS AND SIGNS INVOLVING COGNITIVE FUNCTIONS AND AWARENESS: ICD-10-CM

## 2024-05-10 RX ORDER — CITALOPRAM HYDROBROMIDE 20 MG/1
20 TABLET, FILM COATED ORAL
Qty: 30 | Refills: 1 | Status: ACTIVE | COMMUNITY
Start: 2023-10-31

## 2024-05-11 ENCOUNTER — LABORATORY RESULT (OUTPATIENT)
Age: 74
End: 2024-05-11

## 2024-05-13 ENCOUNTER — TRANSCRIPTION ENCOUNTER (OUTPATIENT)
Age: 74
End: 2024-05-13

## 2024-05-13 RX ORDER — RIVASTIGMINE TARTRATE 4.5 MG/1
4.5 CAPSULE ORAL
Qty: 60 | Refills: 3 | Status: ACTIVE | COMMUNITY
Start: 2023-08-01 | End: 1900-01-01

## 2024-05-20 LAB
ALBUMIN SERPL ELPH-MCNC: 4.4 G/DL
ALP BLD-CCNC: 119 U/L
ALT SERPL-CCNC: 11 U/L
ANION GAP SERPL CALC-SCNC: 13 MMOL/L
APPEARANCE: CLEAR
AST SERPL-CCNC: 13 U/L
BASOPHILS # BLD AUTO: 0.06 K/UL
BASOPHILS NFR BLD AUTO: 0.5 %
BILIRUB SERPL-MCNC: 0.6 MG/DL
BILIRUBIN URINE: NEGATIVE
BLOOD URINE: NEGATIVE
BUN SERPL-MCNC: 33 MG/DL
CALCIUM SERPL-MCNC: 10 MG/DL
CHLORIDE SERPL-SCNC: 103 MMOL/L
CO2 SERPL-SCNC: 24 MMOL/L
COLOR: YELLOW
CREAT SERPL-MCNC: 1.86 MG/DL
EGFR: 38 ML/MIN/1.73M2
EOSINOPHIL # BLD AUTO: 0.26 K/UL
EOSINOPHIL NFR BLD AUTO: 2.3 %
FOLATE SERPL-MCNC: 7.2 NG/ML
GLUCOSE QUALITATIVE U: 250 MG/DL
GLUCOSE SERPL-MCNC: 94 MG/DL
HCT VFR BLD CALC: 38.5 %
HGB BLD-MCNC: 11.9 G/DL
IMM GRANULOCYTES NFR BLD AUTO: 0.4 %
KETONES URINE: NEGATIVE MG/DL
LEUKOCYTE ESTERASE URINE: NEGATIVE
LYMPHOCYTES # BLD AUTO: 2.12 K/UL
LYMPHOCYTES NFR BLD AUTO: 18.6 %
MAN DIFF?: NORMAL
MCHC RBC-ENTMCNC: 29.3 PG
MCHC RBC-ENTMCNC: 30.9 GM/DL
MCV RBC AUTO: 94.8 FL
MONOCYTES # BLD AUTO: 1.03 K/UL
MONOCYTES NFR BLD AUTO: 9 %
NEUTROPHILS # BLD AUTO: 7.88 K/UL
NEUTROPHILS NFR BLD AUTO: 69.2 %
NITRITE URINE: NEGATIVE
PH URINE: 5.5
PLATELET # BLD AUTO: 334 K/UL
POTASSIUM SERPL-SCNC: 5.1 MMOL/L
PROT SERPL-MCNC: 6.8 G/DL
PROTEIN URINE: 30 MG/DL
RBC # BLD: 4.06 M/UL
RBC # FLD: 12.9 %
SODIUM SERPL-SCNC: 141 MMOL/L
SPECIFIC GRAVITY URINE: 1.02
TSH SERPL-ACNC: 1.66 UIU/ML
UROBILINOGEN URINE: 1 MG/DL
VIT B12 SERPL-MCNC: 965 PG/ML
WBC # FLD AUTO: 11.4 K/UL

## 2024-05-21 ENCOUNTER — TRANSCRIPTION ENCOUNTER (OUTPATIENT)
Age: 74
End: 2024-05-21

## 2024-05-21 RX ORDER — OLANZAPINE 2.5 MG/1
2.5 TABLET, FILM COATED ORAL DAILY
Qty: 30 | Refills: 1 | Status: DISCONTINUED | COMMUNITY
Start: 2024-05-10 | End: 2024-05-21

## 2024-05-21 RX ORDER — ALPRAZOLAM 0.5 MG/1
0.5 TABLET ORAL DAILY
Qty: 30 | Refills: 0 | Status: ACTIVE | COMMUNITY
Start: 2024-04-04 | End: 1900-01-01

## 2024-06-03 ENCOUNTER — TRANSCRIPTION ENCOUNTER (OUTPATIENT)
Age: 74
End: 2024-06-03

## 2024-06-07 ENCOUNTER — APPOINTMENT (OUTPATIENT)
Dept: OTOLARYNGOLOGY | Facility: CLINIC | Age: 74
End: 2024-06-07

## 2024-06-10 ENCOUNTER — TRANSCRIPTION ENCOUNTER (OUTPATIENT)
Age: 74
End: 2024-06-10

## 2024-06-11 ENCOUNTER — TRANSCRIPTION ENCOUNTER (OUTPATIENT)
Age: 74
End: 2024-06-11

## 2024-06-14 ENCOUNTER — TRANSCRIPTION ENCOUNTER (OUTPATIENT)
Age: 74
End: 2024-06-14

## 2024-06-17 ENCOUNTER — APPOINTMENT (OUTPATIENT)
Dept: GERIATRICS | Facility: CLINIC | Age: 74
End: 2024-06-17

## 2024-06-17 DIAGNOSIS — R29.6 REPEATED FALLS: ICD-10-CM

## 2024-06-17 PROCEDURE — 99497 ADVNCD CARE PLAN 30 MIN: CPT | Mod: 25,2W

## 2024-06-17 PROCEDURE — 99215 OFFICE O/P EST HI 40 MIN: CPT

## 2024-06-17 PROCEDURE — G2212 PROLONG OUTPT/OFFICE VIS: CPT | Mod: 2W

## 2024-06-17 PROCEDURE — G2211 COMPLEX E/M VISIT ADD ON: CPT

## 2024-06-17 RX ORDER — ALPRAZOLAM 0.25 MG/1
0.25 TABLET ORAL
Qty: 30 | Refills: 0 | Status: COMPLETED | COMMUNITY
Start: 2024-04-04

## 2024-06-17 RX ORDER — SENNOSIDES 8.6 MG/1
8.6 CAPSULE, GELATIN COATED ORAL
Qty: 30 | Refills: 0 | Status: ACTIVE | COMMUNITY
Start: 2024-06-17

## 2024-06-17 RX ORDER — FAMOTIDINE 20 MG/1
20 TABLET, FILM COATED ORAL
Qty: 90 | Refills: 0 | Status: COMPLETED | COMMUNITY
Start: 2023-08-16

## 2024-06-17 RX ORDER — RIVASTIGMINE TARTRATE 6 MG/1
6 CAPSULE ORAL
Qty: 60 | Refills: 0 | Status: COMPLETED | COMMUNITY
Start: 2024-01-08

## 2024-06-17 RX ORDER — FAMOTIDINE 40 MG/1
40 TABLET, FILM COATED ORAL
Qty: 180 | Refills: 0 | Status: COMPLETED | COMMUNITY
Start: 2024-05-28

## 2024-06-17 RX ORDER — ATORVASTATIN CALCIUM 10 MG/1
10 TABLET, FILM COATED ORAL
Qty: 90 | Refills: 0 | Status: COMPLETED | COMMUNITY
Start: 2023-09-07

## 2024-06-17 NOTE — PHYSICAL EXAM
[No Oral Pallor] : no oral pallor [No Oral Cyanosis] : no oral cyanosis [No Respiratory Distress] : no respiratory distress [No Acc Muscle Use] : no accessory muscle use [Respiration, Rhythm And Depth] : normal respiratory rhythm and effort [Normal Color / Pigmentation] : normal skin color and pigmentation [de-identified] : pt. sleeping  [de-identified] : unable to assess  [de-identified] : needs 1-2 person assist w/ ambulating   [de-identified] : sleeping

## 2024-06-17 NOTE — PHYSICAL EXAM
[No Oral Pallor] : no oral pallor [No Oral Cyanosis] : no oral cyanosis [No Respiratory Distress] : no respiratory distress [No Acc Muscle Use] : no accessory muscle use [Respiration, Rhythm And Depth] : normal respiratory rhythm and effort [Normal Color / Pigmentation] : normal skin color and pigmentation [de-identified] : pt. sleeping  [de-identified] : unable to assess  [de-identified] : needs 1-2 person assist w/ ambulating   [de-identified] : sleeping

## 2024-06-17 NOTE — GOALS
[Staff: _____] : staff - [unfilled] [Healthcare proxy document is in chart] : Healthcare proxy document is in chart [DNR/DNI] : DNR/DNI [Hospice discussed] : Hospice discussed [MOLST Completed] : MOLST Completed [FreeTextEntry1] : Meliton  [FreeTextEntry2] : daughter  [FreeTextEntry3] : [phone] [FreeTextEntry7] : Advance Directives: HCP/Advance Directives/Living will:Meliton Moise, daughter   [phone] HCP/Alternate Decision Maker: MOLST: completed today  What matters most? - spouse "my goal is to keep him home"    Visit today to complete MOLST form:  -DNR/DNI -send to the hospital if necessary -limited medical interventions -no feeding tube -determine use or limitation of IV fluids -determine use or limitation of antibiotics -no HD   -pt. not yet eligable for hospice, moderate/severe stage FAST 6D Provided education on MOLST form from [facility] [FreeTextEntry8] : Meliton  [FreeTextEntry9] : daughter  [de-identified] : [phone]

## 2024-06-17 NOTE — HISTORY OF PRESENT ILLNESS
[Two or more falls in past year] : Patient reported two or more falls in the past year [Full assistance needed] : Assistance needed managing medications [] : Assistance needed managing finances. [FAST Score: ____] : Functional Assessment Scale (FAST) Score: [unfilled] [Home] : at home, [unfilled] , at the time of the visit. [Medical Office: ([facility])___] : at the medical office located in  [Family Member] : family member [FreeTextEntry3] : spouse and daughter  [FreeTextEntry1] : Mr. GUERDA MOISE is a 75 yo M with lewy body dementia, was by Dr. Palacios for declining condition, dementia.  -Telehealth visit today   -Pt. presents  with spouse Zully, and Meliton (daughter) who assisted in hx intake due to pt.'s dementia.  #dementia  -appreciated Dr. Kaba visit 12/2023; spouse having a difficult time w/ pt. declining, Dx w. LBD 2020, PT once a week. Celexa for anxiety.  -appreciated Dr. Palacios visit 10/2023; ongoing decline, progression of LBD. Speech fragmented. On rivastigmine 4.5mg, did not tolerate higher doses. Did not tolerate seroquel, but maybe due to mouth infection.   Today, -has declined significantly the last 2 months  -has 24/7 HHA care - trouble sleeping, eating, walking  - "I feel like he is being tortured, I just want to alleviate his symptoms" - reports agitated, paranoid, aggressive, crawling on the ground  -verbally aggressive to spouse -reports orthostatic hypotension, on midodrine  -wandering behavior, tries to leave the home  -"tries hitting everyone, we have male HHAs" -reports he failed Depakote, "didn't feel right" -on xanax PRN 0.5mg MDD 1mg -reports he is very sensitive to meds  -has had 2 episodes of vaso vagal episodes in the past  -tried olanzapine 2.5mg, became manic after 1 week 4/2024, stopped  -hospitalized after trying antipsychotic seroquel 25mg 3/2023; ", did great for 3 days, and then became psychotic. When he was hospitalized he also had a tooth infection, unsure what triggered psychosis" -he sleeps a lot or sometimes  doesn't sleep -eating less -was going to do speech and swallow but since he has been declining it was not a priority -taking melatonin 1mg  -occasional incontinent of urine, gets agitated when trying to get to the bathroom -reports morning agitation is worst  -denies foul smelling urine -fell 2x the past 2 weeks; scrapped his forehead, did not have LOC   #constipation  -chronic -off miralax  -ended up having lose BMs  #ACP - spouse "my goal is to keep him home"   #HCM - Dr. Bakari Fraire Q6 months -it is really hard to get him out of the house    Falls as stated. Denies foul smelling urine, chronic constipation, last BM 2-3 days ago.    Plan -completed MOLST form  -senna 3x/week  -agitation and wandering behavior: seroquel 12.5mg in am; PRN 12.5mg TDD 25mg  -counseled on black box warning of increase sudden in elderly patients but pt. with behaviors and paranoia, safety behavior. Benefit outweigh risk. Spouse expressed understanding. -c/w celexa 20mg   -recommended ED eval due to fall, daughter not want to take him to he hospital    Suspect Medications (associated with mental status changes): no Recent hospitalization/ ED Visits/ Nursing Home Stays:no  Social History: Primary Language: English  Marital Status:  Children: 1? daughter  Education: Occupation: Activity/Exercise: Alcohol: Sexually active:  Driving Habits: Firearms:  Living Situation: Housing (stairs/levels): Home-single story Length at Residence: Lives with: Caregivers (non-paid and paid): Safety Concerns: none  Wandering: Falls: Fear of Falling:   Financial Situation:  Advance Directives: HCP/Advance Directives/Living will:Meliton Moise  [phone] HCP/Alternate Decision Maker: MOLST: completed today  What matters most? - spouse "my goal is to keep him home"    Visit today to complete MOLST form:  -DNR/DNI -send to the hospital if necessary -limited medical interventions -no feeding tube -determine use or limitation of IV fluids -determine use or limitation of antibiotics -no HD   Provided education on MOLST form from [facility]    [FreeTextEntry8] : urine  [de-identified] : 1 [de-identified] : 0 [Reviewed updated] : Reviewed, updated [Designated Healthcare Proxy] : Designated healthcare proxy [Name: ___] : Health Care Proxy's Name: [unfilled]  [Relationship: ___] : Relationship: [unfilled] [I will adhere to the patient's wishes.] : I will adhere to the patient's wishes. [Time Spent: ___ minutes] : Time Spent: [unfilled] minutes [AdvancecareDate] : 6/17/24 [FreeTextEntry4] : Advance Directives: HCP/Advance Directives/Living will:Meliton Gradyin  [phone] HCP/Alternate Decision Maker: MOLST: completed today  What matters most? - spouse "my goal is to keep him home"    Visit today to complete MOLST form:  -DNR/DNI -send to the hospital if necessary -limited medical interventions -no feeding tube -determine use or limitation of IV fluids -determine use or limitation of antibiotics -no HD   Provided education on MOLST form from [facility]

## 2024-06-17 NOTE — GOALS
[Staff: _____] : staff - [unfilled] [Healthcare proxy document is in chart] : Healthcare proxy document is in chart [DNR/DNI] : DNR/DNI [Hospice discussed] : Hospice discussed [MOLST Completed] : MOLST Completed [FreeTextEntry1] : Meliton  [FreeTextEntry2] : daughter  [FreeTextEntry3] : [phone] [FreeTextEntry7] : Advance Directives: HCP/Advance Directives/Living will:Meliton Moise, daughter   [phone] HCP/Alternate Decision Maker: MOLST: completed today  What matters most? - spouse "my goal is to keep him home"    Visit today to complete MOLST form:  -DNR/DNI -send to the hospital if necessary -limited medical interventions -no feeding tube -determine use or limitation of IV fluids -determine use or limitation of antibiotics -no HD   -pt. not yet eligable for hospice, moderate/severe stage FAST 6D Provided education on MOLST form from [facility] [FreeTextEntry8] : Meliton  [FreeTextEntry9] : daughter  [de-identified] : [phone]

## 2024-06-17 NOTE — HISTORY OF PRESENT ILLNESS
[Two or more falls in past year] : Patient reported two or more falls in the past year [Full assistance needed] : Assistance needed managing medications [] : Assistance needed managing finances. [FAST Score: ____] : Functional Assessment Scale (FAST) Score: [unfilled] [Home] : at home, [unfilled] , at the time of the visit. [Medical Office: ([facility])___] : at the medical office located in  [Family Member] : family member [FreeTextEntry3] : spouse and daughter  [FreeTextEntry1] : Mr. GUERDA MOISE is a 73 yo M with lewy body dementia, was by Dr. Palacios for declining condition, dementia.  -Telehealth visit today   -Pt. presents  with spouse Zully, and Meliton (daughter) who assisted in hx intake due to pt.'s dementia.  #dementia  -appreciated Dr. Kaba visit 12/2023; spouse having a difficult time w/ pt. declining, Dx w. LBD 2020, PT once a week. Celexa for anxiety.  -appreciated Dr. Palacios visit 10/2023; ongoing decline, progression of LBD. Speech fragmented. On rivastigmine 4.5mg, did not tolerate higher doses. Did not tolerate seroquel, but maybe due to mouth infection.   Today, -has declined significantly the last 2 months  -has 24/7 HHA care - trouble sleeping, eating, walking  - "I feel like he is being tortured, I just want to alleviate his symptoms" - reports agitated, paranoid, aggressive, crawling on the ground  -verbally aggressive to spouse -reports orthostatic hypotension, on midodrine  -wandering behavior, tries to leave the home  -"tries hitting everyone, we have male HHAs" -reports he failed Depakote, "didn't feel right" -on xanax PRN 0.5mg MDD 1mg -reports he is very sensitive to meds  -has had 2 episodes of vaso vagal episodes in the past  -tried olanzapine 2.5mg, became manic after 1 week 4/2024, stopped  -hospitalized after trying antipsychotic seroquel 25mg 3/2023; ", did great for 3 days, and then became psychotic. When he was hospitalized he also had a tooth infection, unsure what triggered psychosis" -he sleeps a lot or sometimes  doesn't sleep -eating less -was going to do speech and swallow but since he has been declining it was not a priority -taking melatonin 1mg  -occasional incontinent of urine, gets agitated when trying to get to the bathroom -reports morning agitation is worst  -denies foul smelling urine -fell 2x the past 2 weeks; scrapped his forehead, did not have LOC   #constipation  -chronic -off miralax  -ended up having lose BMs  #ACP - spouse "my goal is to keep him home"   #HCM - Dr. Bakari Fraire Q6 months -it is really hard to get him out of the house    Falls as stated. Denies foul smelling urine, chronic constipation, last BM 2-3 days ago.    Plan -completed MOLST form  -senna 3x/week  -agitation and wandering behavior: seroquel 12.5mg in am; PRN 12.5mg TDD 25mg  -counseled on black box warning of increase sudden in elderly patients but pt. with behaviors and paranoia, safety behavior. Benefit outweigh risk. Spouse expressed understanding. -c/w celexa 20mg   -recommended ED eval due to fall, daughter not want to take him to he hospital    Suspect Medications (associated with mental status changes): no Recent hospitalization/ ED Visits/ Nursing Home Stays:no  Social History: Primary Language: English  Marital Status:  Children: 1? daughter  Education: Occupation: Activity/Exercise: Alcohol: Sexually active:  Driving Habits: Firearms:  Living Situation: Housing (stairs/levels): Home-single story Length at Residence: Lives with: Caregivers (non-paid and paid): Safety Concerns: none  Wandering: Falls: Fear of Falling:   Financial Situation:  Advance Directives: HCP/Advance Directives/Living will:Meliton Moise  [phone] HCP/Alternate Decision Maker: MOLST: completed today  What matters most? - spouse "my goal is to keep him home"    Visit today to complete MOLST form:  -DNR/DNI -send to the hospital if necessary -limited medical interventions -no feeding tube -determine use or limitation of IV fluids -determine use or limitation of antibiotics -no HD   Provided education on MOLST form from [facility]    [FreeTextEntry8] : urine  [de-identified] : 1 [de-identified] : 0 [Reviewed updated] : Reviewed, updated [Designated Healthcare Proxy] : Designated healthcare proxy [Name: ___] : Health Care Proxy's Name: [unfilled]  [Relationship: ___] : Relationship: [unfilled] [I will adhere to the patient's wishes.] : I will adhere to the patient's wishes. [Time Spent: ___ minutes] : Time Spent: [unfilled] minutes [AdvancecareDate] : 6/17/24 [FreeTextEntry4] : Advance Directives: HCP/Advance Directives/Living will:Meliton Gradyin  [phone] HCP/Alternate Decision Maker: MOLST: completed today  What matters most? - spouse "my goal is to keep him home"    Visit today to complete MOLST form:  -DNR/DNI -send to the hospital if necessary -limited medical interventions -no feeding tube -determine use or limitation of IV fluids -determine use or limitation of antibiotics -no HD   Provided education on MOLST form from [facility]

## 2024-06-25 ENCOUNTER — NON-APPOINTMENT (OUTPATIENT)
Age: 74
End: 2024-06-25

## 2024-06-25 RX ORDER — QUETIAPINE FUMARATE 25 MG/1
25 TABLET ORAL
Qty: 30 | Refills: 1 | Status: DISCONTINUED | COMMUNITY
Start: 2024-06-17 | End: 2024-06-25

## 2024-07-01 ENCOUNTER — NON-APPOINTMENT (OUTPATIENT)
Age: 74
End: 2024-07-01

## 2024-07-02 ENCOUNTER — APPOINTMENT (OUTPATIENT)
Dept: UROLOGY | Facility: CLINIC | Age: 74
End: 2024-07-02

## 2024-07-02 ENCOUNTER — APPOINTMENT (OUTPATIENT)
Dept: GERIATRICS | Facility: CLINIC | Age: 74
End: 2024-07-02
Payer: MEDICARE

## 2024-07-02 DIAGNOSIS — F41.9 ANXIETY DISORDER, UNSPECIFIED: ICD-10-CM

## 2024-07-02 PROBLEM — G47.01 INSOMNIA DUE TO MEDICAL CONDITION: Status: ACTIVE | Noted: 2024-06-17

## 2024-07-02 PROBLEM — Z71.89 ACP (ADVANCE CARE PLANNING): Status: ACTIVE | Noted: 2023-12-12

## 2024-07-02 PROBLEM — F03.911 AGITATION DUE TO DEMENTIA: Status: ACTIVE | Noted: 2024-06-17

## 2024-07-02 PROBLEM — Z63.6 CAREGIVER STRESS: Status: ACTIVE | Noted: 2023-12-12

## 2024-07-02 PROBLEM — G31.83 LEWY BODY DEMENTIA WITH BEHAVIORAL DISTURBANCE: Status: ACTIVE | Noted: 2023-08-01

## 2024-07-02 PROCEDURE — G2212 PROLONG OUTPT/OFFICE VIS: CPT | Mod: 2W

## 2024-07-02 PROCEDURE — 99215 OFFICE O/P EST HI 40 MIN: CPT

## 2024-07-05 ENCOUNTER — APPOINTMENT (OUTPATIENT)
Dept: GERIATRICS | Facility: CLINIC | Age: 74
End: 2024-07-05

## 2024-07-15 ENCOUNTER — TRANSCRIPTION ENCOUNTER (OUTPATIENT)
Age: 74
End: 2024-07-15

## 2024-07-15 ENCOUNTER — APPOINTMENT (OUTPATIENT)
Dept: HOME HEALTH SERVICES | Facility: HOME HEALTH | Age: 74
End: 2024-07-15
Payer: MEDICARE

## 2024-07-15 VITALS
DIASTOLIC BLOOD PRESSURE: 60 MMHG | OXYGEN SATURATION: 98 % | HEIGHT: 72 IN | HEART RATE: 80 BPM | TEMPERATURE: 97.6 F | SYSTOLIC BLOOD PRESSURE: 100 MMHG | BODY MASS INDEX: 21.67 KG/M2 | WEIGHT: 160 LBS

## 2024-07-15 PROCEDURE — G0506: CPT

## 2024-07-15 PROCEDURE — 99497 ADVNCD CARE PLAN 30 MIN: CPT

## 2024-07-15 PROCEDURE — 99344 HOME/RES VST NEW MOD MDM 60: CPT

## 2024-07-17 ENCOUNTER — APPOINTMENT (OUTPATIENT)
Dept: GERIATRICS | Facility: CLINIC | Age: 74
End: 2024-07-17
Payer: MEDICARE

## 2024-07-17 DIAGNOSIS — R63.4 ABNORMAL WEIGHT LOSS: ICD-10-CM

## 2024-07-17 DIAGNOSIS — C61 MALIGNANT NEOPLASM OF PROSTATE: ICD-10-CM

## 2024-07-17 PROCEDURE — 99204 OFFICE O/P NEW MOD 45 MIN: CPT

## 2024-07-17 RX ORDER — CITALOPRAM HYDROBROMIDE 10 MG/1
10 TABLET, FILM COATED ORAL AT BEDTIME
Qty: 30 | Refills: 3 | Status: ACTIVE | COMMUNITY
Start: 2024-07-17 | End: 1900-01-01

## 2024-07-18 ENCOUNTER — LABORATORY RESULT (OUTPATIENT)
Age: 74
End: 2024-07-18

## 2024-07-18 PROBLEM — R63.4 WEIGHT LOSS: Status: ACTIVE | Noted: 2024-07-17

## 2024-07-22 ENCOUNTER — APPOINTMENT (OUTPATIENT)
Dept: GERIATRICS | Facility: CLINIC | Age: 74
End: 2024-07-22
Payer: MEDICARE

## 2024-07-22 DIAGNOSIS — F02.818 DEMENTIA WITH LEWY BODIES: ICD-10-CM

## 2024-07-22 DIAGNOSIS — F03.911 UNSPECIFIED DEMENTIA, UNSPECIFIED SEVERITY, WITH AGITATION: ICD-10-CM

## 2024-07-22 DIAGNOSIS — Z71.89 OTHER SPECIFIED COUNSELING: ICD-10-CM

## 2024-07-22 DIAGNOSIS — N39.0 URINARY TRACT INFECTION, SITE NOT SPECIFIED: ICD-10-CM

## 2024-07-22 DIAGNOSIS — Z63.6 DEPENDENT RELATIVE NEEDING CARE AT HOME: ICD-10-CM

## 2024-07-22 DIAGNOSIS — G47.01 INSOMNIA DUE TO MEDICAL CONDITION: ICD-10-CM

## 2024-07-22 DIAGNOSIS — K59.09 OTHER CONSTIPATION: ICD-10-CM

## 2024-07-22 DIAGNOSIS — G31.83 DEMENTIA WITH LEWY BODIES: ICD-10-CM

## 2024-07-22 PROCEDURE — 99215 OFFICE O/P EST HI 40 MIN: CPT

## 2024-07-22 PROCEDURE — G2211 COMPLEX E/M VISIT ADD ON: CPT

## 2024-07-22 RX ORDER — GABAPENTIN 100 MG/1
100 CAPSULE ORAL
Qty: 120 | Refills: 1 | Status: ACTIVE | COMMUNITY
Start: 2024-07-02 | End: 1900-01-01

## 2024-07-22 SDOH — SOCIAL STABILITY - SOCIAL INSECURITY: DEPENDENT RELATIVE NEEDING CARE AT HOME: Z63.6

## 2024-07-22 NOTE — PHYSICAL EXAM
[No Oral Pallor] : no oral pallor [No Oral Cyanosis] : no oral cyanosis [Alert] : alert [Well Nourished] : well nourished [No Acute Distress] : in no acute distress [Oropharynx] : the oropharynx was normal [No Respiratory Distress] : no respiratory distress [No Acc Muscle Use] : no accessory muscle use [Respiration, Rhythm And Depth] : normal respiratory rhythm and effort [de-identified] : incomprehensible speech  [de-identified] : unable to assess  [de-identified] : needs 1-2 person assist w/ ambulating   [de-identified] : sleeping

## 2024-07-22 NOTE — HISTORY OF PRESENT ILLNESS
[Home] : at home, [unfilled] , at the time of the visit. [Medical Office: ([facility])___] : at the medical office located in  [Family Member] : family member [Two or more falls in past year] : Patient reported two or more falls in the past year [Full assistance needed] : Assistance needed managing medications [FAST Score: ____] : Functional Assessment Scale (FAST) Score: [unfilled] [] : Assistance needed managing finances. [Reviewed updated] : Reviewed, updated [Designated Healthcare Proxy] : Designated healthcare proxy [Name: ___] : Health Care Proxy's Name: [unfilled]  [Relationship: ___] : Relationship: [unfilled] [I will adhere to the patient's wishes.] : I will adhere to the patient's wishes. [FreeTextEntry3] : spouse and daughter  [FreeTextEntry1] : Mr. GUERDA MOISE is a 73 yo M with lewy body dementia, was by Dr. Palacios for declining condition, dementia.  -Telehealth visit today   -Pt. presents  with spouse Zully, and Meliton (daughter) who assisted in hx intake due to pt.'s dementia.  #dementia  -appreciated Dr. [facility] appt. 7/17/24; increased citalopram 30mg (10mg in am and 20mg pm)  -established care with house calls  -overall much calmer, and redirectable -less aggressive on gabapentin "hitting much less"  -triggers are always changing depends -denies falls  -reports sleeping a lot more, sleeping after breakfast -calmer than he does  -sleeping better   #constipation  -chronic -reports he did not have a BM for 2 weeks  -colace 100mg daily  -did not start senna -off miralax    #ACP - spouse "my goal is to keep him home"  -received MOLST form  -DNR/DNI   #HCM - Dr. Bakari Fraire Q6 months, next month 8/2024 -it is really hard to get him out of the house, now established care w/ house calls    No new falls. Denies foul smelling urine, chronic constipation, last BM yesterday.  Plan  -pending labs ordered by Michael Cai  -labs pending w/ home draw CBC, CMP, PSA,UA  -advised senna 3x/week, c/w colace daily  -refills for gabapentin  (7/2/24) #dementia  -reports pt. is not doing great  -reports on abilify he continued to be very aggressive, "trying to bite" -tried abilify 2 days -off Abilify 6/30/24 -failed seroquel, zyprexa, abilify  -psychiatrist has ordered prn Depakote 125mg PRN a few years ago, improved agitated but became really sedated  -reports he is now mainly calm in the morning "sleeps until late morning" -overall, he's been sleeping better w/ melatonin 1mg QHS, but yesterday was up all night  -trigger is the shower, brushing his teeth  -has had 3 vasal vagal episodes in the last few weeks  -3-4pm less irritable -usually aggressive late morning/early afternoon   #constipation  -chronic -improving, on senna 3x/week  -off miralax    #ACP - spouse "my goal is to keep him home"  -received MOLST form  -DNR/DNI   #HCM - Dr. Bakari Fraire Q6 months, next month 8/2024 -it is really hard to get him out of the house   Plan  -monitor insomnia, if persists increase melatonin 2mg at bedtime  -trial gabapentin 100mg for anxiety, counseled on SE, CrCl 39ml/min  -primary care, recommended [facility] calls for the future primary care since it is difficult to get pt. out of the house  -follow up in 1-2 weeks   No new falls. Denies foul smelling urine, chronic constipation, last BM 2-3 days ago.   (6/17/24) #dementia  -appreciated Dr. Kaba visit 12/2023; spouse having a difficult time w/ pt. declining, Dx w. LBD 2020, PT once a week. Celexa for anxiety.  -appreciated Dr. Palacios visit 10/2023; ongoing decline, progression of LBD. Speech fragmented. On rivastigmine 4.5mg, did not tolerate higher doses. Did not tolerate seroquel, but maybe due to mouth infection.   Today, -has declined significantly the last 2 months  -has 24/7 HHA care - trouble sleeping, eating, walking  - "I feel like he is being tortured, I just want to alleviate his symptoms" - reports agitated, paranoid, aggressive, crawling on the ground  -verbally aggressive to spouse -reports orthostatic hypotension, on midodrine  -wandering behavior, tries to leave the home  -"tries hitting everyone, we have male HHAs" -reports he failed Depakote, "didn't feel right" -on xanax PRN 0.5mg MDD 1mg -reports he is very sensitive to meds  -has had 2 episodes of vaso vagal episodes in the past  -tried olanzapine 2.5mg, became manic after 1 week 4/2024, stopped  -hospitalized after trying antipsychotic seroquel 25mg 3/2023; ", did great for 3 days, and then became psychotic. When he was hospitalized he also had a tooth infection, unsure what triggered psychosis" -he sleeps a lot or sometimes  doesn't sleep -eating less -was going to do speech and swallow but since he has been declining it was not a priority -taking melatonin 1mg  -occasional incontinent of urine, gets agitated when trying to get to the bathroom -reports morning agitation is worst  -denies foul smelling urine -fell 2x the past 2 weeks; scrapped his forehead, did not have LOC   #constipation  -chronic -off miralax  -ended up having lose BMs  #ACP - spouse "my goal is to keep him home"   #HCM - Dr. Bakari Fraire Q6 months -it is really hard to get him out of the house    Falls as stated. Denies foul smelling urine, chronic constipation, last BM 2-3 days ago.    Plan -completed MOLST form  -senna 3x/week  -agitation and wandering behavior: seroquel 12.5mg in am; PRN 12.5mg TDD 25mg  -counseled on black box warning of increase sudden in elderly patients but pt. with behaviors and paranoia, safety behavior. Benefit outweigh risk. Spouse expressed understanding. -c/w celexa 20mg   -recommended ED eval due to fall, daughter not want to take him to he hospital    Suspect Medications (associated with mental status changes): no Recent hospitalization/ ED Visits/ Nursing Home Stays:no  Social History: Primary Language: English  Marital Status:  Children: 1? daughter  Education: Occupation: Activity/Exercise: Alcohol: Sexually active:  Driving Habits: Firearms:  Living Situation: Housing (stairs/levels): Home-single story Length at Residence: Lives with: Caregivers (non-paid and paid): Safety Concerns: none  Wandering: Falls: Fear of Falling:   Financial Situation:  Advance Directives: HCP/Advance Directives/Living will:Meliton Moise  [phone] HCP/Alternate Decision Maker: MOLST: completed today  What matters most? - spouse "my goal is to keep him home"    Visit today to complete MOLST form:  -DNR/DNI -send to the hospital if necessary -limited medical interventions -no feeding tube -determine use or limitation of IV fluids -determine use or limitation of antibiotics -no HD   Provided education on MOLST form from [facility]    [FreeTextEntry8] : urine  [de-identified] : 1 [de-identified] : 0 [AdvancecareDate] : 7/22/24 [FreeTextEntry4] : Advance Directives: HCP/Advance Directives/Living will:Meliton Gradyin  [phone] HCP/Alternate Decision Maker: MOLST: completed today  What matters most? - spouse "my goal is to keep him home"    Visit today to complete MOLST form:  -DNR/DNI -send to the hospital if necessary -limited medical interventions -no feeding tube -determine use or limitation of IV fluids -determine use or limitation of antibiotics -no HD   Provided education on MOLST form from [facility]

## 2024-07-23 ENCOUNTER — LABORATORY RESULT (OUTPATIENT)
Age: 74
End: 2024-07-23

## 2024-07-24 ENCOUNTER — NON-APPOINTMENT (OUTPATIENT)
Age: 74
End: 2024-07-24

## 2024-07-24 LAB
ALBUMIN SERPL ELPH-MCNC: 4.2 G/DL
ALP BLD-CCNC: 103 U/L
ALT SERPL-CCNC: 7 U/L
ANION GAP SERPL CALC-SCNC: 13 MMOL/L
AST SERPL-CCNC: 10 U/L
BASOPHILS # BLD AUTO: 0.06 K/UL
BASOPHILS NFR BLD AUTO: 0.8 %
BILIRUB SERPL-MCNC: 0.4 MG/DL
BUN SERPL-MCNC: 30 MG/DL
CALCIUM SERPL-MCNC: 9.9 MG/DL
CHLORIDE SERPL-SCNC: 107 MMOL/L
CO2 SERPL-SCNC: 24 MMOL/L
CREAT SERPL-MCNC: 1.84 MG/DL
EGFR: 38 ML/MIN/1.73M2
EOSINOPHIL # BLD AUTO: 0.15 K/UL
EOSINOPHIL NFR BLD AUTO: 2.1 %
GLUCOSE SERPL-MCNC: 117 MG/DL
HCT VFR BLD CALC: 34.5 %
HGB BLD-MCNC: 11.1 G/DL
IMM GRANULOCYTES NFR BLD AUTO: 0.3 %
LYMPHOCYTES # BLD AUTO: 1.48 K/UL
LYMPHOCYTES NFR BLD AUTO: 21 %
MAN DIFF?: NORMAL
MCHC RBC-ENTMCNC: 29.4 PG
MCHC RBC-ENTMCNC: 32.2 GM/DL
MCV RBC AUTO: 91.5 FL
MONOCYTES # BLD AUTO: 0.84 K/UL
MONOCYTES NFR BLD AUTO: 11.9 %
NEUTROPHILS # BLD AUTO: 4.51 K/UL
NEUTROPHILS NFR BLD AUTO: 63.9 %
PLATELET # BLD AUTO: 307 K/UL
POTASSIUM SERPL-SCNC: 4.4 MMOL/L
PROT SERPL-MCNC: 6.2 G/DL
RBC # BLD: 3.77 M/UL
RBC # FLD: 13.7 %
SODIUM SERPL-SCNC: 144 MMOL/L
WBC # FLD AUTO: 7.06 K/UL

## 2024-08-14 ENCOUNTER — TRANSCRIPTION ENCOUNTER (OUTPATIENT)
Age: 74
End: 2024-08-14

## 2024-08-14 ENCOUNTER — NON-APPOINTMENT (OUTPATIENT)
Age: 74
End: 2024-08-14

## 2024-08-20 ENCOUNTER — APPOINTMENT (OUTPATIENT)
Dept: GERIATRICS | Facility: CLINIC | Age: 74
End: 2024-08-20
Payer: MEDICARE

## 2024-08-20 DIAGNOSIS — Z71.89 OTHER SPECIFIED COUNSELING: ICD-10-CM

## 2024-08-20 DIAGNOSIS — Z63.6 DEPENDENT RELATIVE NEEDING CARE AT HOME: ICD-10-CM

## 2024-08-20 PROCEDURE — 99443: CPT | Mod: 93

## 2024-08-20 SDOH — SOCIAL STABILITY - SOCIAL INSECURITY: DEPENDENT RELATIVE NEEDING CARE AT HOME: Z63.6

## 2024-08-26 ENCOUNTER — APPOINTMENT (OUTPATIENT)
Dept: GERIATRICS | Facility: CLINIC | Age: 74
End: 2024-08-26
Payer: MEDICARE

## 2024-08-26 DIAGNOSIS — R63.4 ABNORMAL WEIGHT LOSS: ICD-10-CM

## 2024-08-26 DIAGNOSIS — C61 MALIGNANT NEOPLASM OF PROSTATE: ICD-10-CM

## 2024-08-26 DIAGNOSIS — F03.911 UNSPECIFIED DEMENTIA, UNSPECIFIED SEVERITY, WITH AGITATION: ICD-10-CM

## 2024-08-26 DIAGNOSIS — K59.09 OTHER CONSTIPATION: ICD-10-CM

## 2024-08-26 PROCEDURE — G2211 COMPLEX E/M VISIT ADD ON: CPT

## 2024-08-26 PROCEDURE — 99214 OFFICE O/P EST MOD 30 MIN: CPT

## 2024-08-26 NOTE — PHYSICAL EXAM
[General Appearance - Alert] : alert [General Appearance - In No Acute Distress] : in no acute distress [Sclera] : the sclera and conjunctiva were normal [Normal Oral Mucosa] : normal oral mucosa [Outer Ear] : the ears and nose were normal in appearance [Neck Appearance] : the appearance of the neck was normal [] : no respiratory distress [Respiration, Rhythm And Depth] : normal respiratory rhythm and effort

## 2024-08-27 NOTE — HISTORY OF PRESENT ILLNESS
[Home] : at home, [unfilled] , at the time of the visit. [Medical Office: ([facility])___] : at the medical office located in  [Verbal consent obtained from patient] : the patient, [unfilled] [FreeTextEntry1] : Mr. GUERDA LEE is a 73 yo M with Lewy body dementia, prostate cancer, anxiety / depression, constipation and insomnia.  Pt has progressive decline in behavior, agitation and visual hallucinations over the past months. He has become weaker and is currently house bound. He requires assistance on ADL's like toileting, changing his clothes, taking medications, brushing his teeth and feeding at times.  Since last visit he has become weaker and has lost weight. He is still able to take a couple of steps with assistance and family wishes to explore PT at home.   Due to weakness his agitation has improved, he sometimes becomes fixated on "wanting to see the doctor to help him feel better". He tries to get up and when he realizes he is too weak to perform the task he gives up on fixation. Family feels that his caretakers, wife and daughter are better equipped to manage his behavior changes and are able to redirect him easier.    For his mood he is taking Citalopram 20mg in the morning and Gabapentin 100mg at night. He takes 100mg in the morning only as needed for agitation. They tried higher dose of Citalopram 30mg but this made him very drowsy.   He has poor controlled constipation; he is currently taking Colace and prune juice. His wife give MiraLAX as needed as well. He is now diaper dependent for both urine and stool.   Advance care planning discussions, current decision makers are his wife Zully and his daughter Meliton (who works as a Cardiology PA, [phone]). MOLST completed in the past DNR / DNI. Main goal is to keep him at home and to improve his quality of life.   17

## 2024-08-27 NOTE — ASSESSMENT
[FreeTextEntry1] : - met with pts wife and daughter, for supportive care on managing pts symptoms and hopes of improving quality of life - f/u in 1 month earlier if needed

## 2024-08-27 NOTE — HISTORY OF PRESENT ILLNESS
[Home] : at home, [unfilled] , at the time of the visit. [Medical Office: ([facility])___] : at the medical office located in  [Verbal consent obtained from patient] : the patient, [unfilled] [FreeTextEntry1] : Mr. GUERDA LEE is a 73 yo M with Lewy body dementia, prostate cancer, anxiety / depression, constipation and insomnia.  Pt has progressive decline in behavior, agitation and visual hallucinations over the past months. He has become weaker and is currently house bound. He requires assistance on ADL's like toileting, changing his clothes, taking medications, brushing his teeth and feeding at times.  Since last visit he has become weaker and has lost weight. He is still able to take a couple of steps with assistance and family wishes to explore PT at home.   Due to weakness his agitation has improved, he sometimes becomes fixated on "wanting to see the doctor to help him feel better". He tries to get up and when he realizes he is too weak to perform the task he gives up on fixation. Family feels that his caretakers, wife and daughter are better equipped to manage his behavior changes and are able to redirect him easier.    For his mood he is taking Citalopram 20mg in the morning and Gabapentin 100mg at night. He takes 100mg in the morning only as needed for agitation. They tried higher dose of Citalopram 30mg but this made him very drowsy.   He has poor controlled constipation; he is currently taking Colace and prune juice. His wife give MiraLAX as needed as well. He is now diaper dependent for both urine and stool.   Advance care planning discussions, current decision makers are his wife Zully and his daughter Meliton (who works as a Cardiology PA, [phone]). MOLST completed in the past DNR / DNI. Main goal is to keep him at home and to improve his quality of life.

## 2024-09-03 ENCOUNTER — TRANSCRIPTION ENCOUNTER (OUTPATIENT)
Age: 74
End: 2024-09-03

## 2024-09-06 ENCOUNTER — NON-APPOINTMENT (OUTPATIENT)
Age: 74
End: 2024-09-06

## 2024-09-16 ENCOUNTER — TRANSCRIPTION ENCOUNTER (OUTPATIENT)
Age: 74
End: 2024-09-16

## 2024-09-23 ENCOUNTER — APPOINTMENT (OUTPATIENT)
Dept: GERIATRICS | Facility: CLINIC | Age: 74
End: 2024-09-23
Payer: MEDICARE

## 2024-09-23 DIAGNOSIS — F03.911 UNSPECIFIED DEMENTIA, UNSPECIFIED SEVERITY, WITH AGITATION: ICD-10-CM

## 2024-09-23 DIAGNOSIS — G47.00 INSOMNIA, UNSPECIFIED: ICD-10-CM

## 2024-09-23 DIAGNOSIS — K59.09 OTHER CONSTIPATION: ICD-10-CM

## 2024-09-23 DIAGNOSIS — Z71.89 OTHER SPECIFIED COUNSELING: ICD-10-CM

## 2024-09-23 DIAGNOSIS — R63.4 ABNORMAL WEIGHT LOSS: ICD-10-CM

## 2024-09-23 DIAGNOSIS — K29.00 ACUTE GASTRITIS W/OUT BLEEDING: ICD-10-CM

## 2024-09-23 DIAGNOSIS — R13.10 DYSPHAGIA, UNSPECIFIED: ICD-10-CM

## 2024-09-23 DIAGNOSIS — R53.81 OTHER MALAISE: ICD-10-CM

## 2024-09-23 PROCEDURE — G2211 COMPLEX E/M VISIT ADD ON: CPT

## 2024-09-23 PROCEDURE — 99214 OFFICE O/P EST MOD 30 MIN: CPT

## 2024-09-23 RX ORDER — GABAPENTIN 250 MG/5ML
250 SOLUTION ORAL
Qty: 60 | Refills: 3 | Status: ACTIVE | COMMUNITY
Start: 2024-09-23 | End: 1900-01-01

## 2024-09-23 RX ORDER — SUVOREXANT 5 MG/1
5 TABLET, FILM COATED ORAL
Qty: 30 | Refills: 3 | Status: ACTIVE | COMMUNITY
Start: 2024-09-23 | End: 1900-01-01

## 2024-09-23 RX ORDER — ALPRAZOLAM 0.5 MG/1
0.5 TABLET ORAL
Qty: 30 | Refills: 0 | Status: COMPLETED | COMMUNITY
Start: 2024-07-15

## 2024-09-24 PROBLEM — R53.81 DEBILITY: Status: ACTIVE | Noted: 2024-09-24

## 2024-09-24 PROBLEM — K29.00 ACUTE GASTRITIS WITHOUT HEMORRHAGE, UNSPECIFIED GASTRITIS TYPE: Status: ACTIVE | Noted: 2024-09-23

## 2024-09-24 PROBLEM — G47.00 INSOMNIA: Status: ACTIVE | Noted: 2024-09-23

## 2024-09-24 RX ORDER — SENNOSIDES 8.8 MG/5ML
8.8 LIQUID ORAL TWICE DAILY
Qty: 3 | Refills: 3 | Status: ACTIVE | COMMUNITY
Start: 2024-09-24 | End: 1900-01-01

## 2024-09-24 NOTE — HISTORY OF PRESENT ILLNESS
[Home] : at home, [unfilled] , at the time of the visit. [Medical Office: ([facility])___] : at the medical office located in  [Verbal consent obtained from patient] : the patient, [unfilled] [FreeTextEntry1] : Mr. GUERDA LEE is a 73 yo M with Lewy body dementia, prostate cancer, anxiety / depression, constipation and insomnia.  Pt has progressive decline in behavior, agitation and visual hallucinations over the past months. He has become weaker and is currently house bound. He requires assistance on ADL's like toileting, changing his clothes, taking medications, brushing his teeth and feeding at times.  Since last visit he has become weaker and continues to lose weight. He was unable to participate with PT due to confusion and overall debility.  Due to weakness his agitation has improved, he sometimes becomes fixated on "wanting to see the doctor to help him feel better". He tries to get up and when he realizes he is too weak to perform the task he gives up on fixation. This also happens with his fixation around going to the toilet, wishes to go constantly but unable to do it due to overall weakness. Family feels that his caretakers, wife and daughter are better equipped to manage his behavior changes and are able to redirect him easier.    For his mood he is taking Citalopram 20mg in the morning and Gabapentin 100mg at night. He takes 100mg in the morning only as needed for agitation. They tried higher dose of Citalopram 30mg but this made him very drowsy. They also tried giving him alprazolam, but this made him more confused and agitated.  He is having problems swallowing and family wishes to change his medication to solution form instead.    He has poor controlled constipation; he is currently taking Colace and prune juice. His wife give MiraLAX as needed as well. He is now diaper dependent for both urine and stool.   Advance care planning discussions, current decision makers are his wife Zully and his daughter Meliton (who works as a Cardiology PA, [phone]). MOLST completed in the past DNR / DNI. Main goal is to keep him at home and to improve his quality of life.

## 2024-09-24 NOTE — HISTORY OF PRESENT ILLNESS
[Home] : at home, [unfilled] , at the time of the visit. [Medical Office: ([facility])___] : at the medical office located in  [Verbal consent obtained from patient] : the patient, [unfilled] [FreeTextEntry1] : Mr. GUERDA LEE is a 75 yo M with Lewy body dementia, prostate cancer, anxiety / depression, constipation and insomnia.  Pt has progressive decline in behavior, agitation and visual hallucinations over the past months. He has become weaker and is currently house bound. He requires assistance on ADL's like toileting, changing his clothes, taking medications, brushing his teeth and feeding at times.  Since last visit he has become weaker and continues to lose weight. He was unable to participate with PT due to confusion and overall debility.  Due to weakness his agitation has improved, he sometimes becomes fixated on "wanting to see the doctor to help him feel better". He tries to get up and when he realizes he is too weak to perform the task he gives up on fixation. This also happens with his fixation around going to the toilet, wishes to go constantly but unable to do it due to overall weakness. Family feels that his caretakers, wife and daughter are better equipped to manage his behavior changes and are able to redirect him easier.    For his mood he is taking Citalopram 20mg in the morning and Gabapentin 100mg at night. He takes 100mg in the morning only as needed for agitation. They tried higher dose of Citalopram 30mg but this made him very drowsy. They also tried giving him alprazolam, but this made him more confused and agitated.  He is having problems swallowing and family wishes to change his medication to solution form instead.    He has poor controlled constipation; he is currently taking Colace and prune juice. His wife give MiraLAX as needed as well. He is now diaper dependent for both urine and stool.   Advance care planning discussions, current decision makers are his wife Zully and his daughter Meliton (who works as a Cardiology PA, [phone]). MOLST completed in the past DNR / DNI. Main goal is to keep him at home and to improve his quality of life.

## 2024-09-24 NOTE — ASSESSMENT
[FreeTextEntry1] : - met with pts wife and daughter, for supportive care on managing pts symptoms and hopes of improving quality of life. Hospice referral made as pt continues to decline  - f/u in 1 month earlier if needed

## 2024-10-07 ENCOUNTER — RX RENEWAL (OUTPATIENT)
Age: 74
End: 2024-10-07

## 2024-10-08 ENCOUNTER — APPOINTMENT (OUTPATIENT)
Dept: NEUROLOGY | Facility: CLINIC | Age: 74
End: 2024-10-08

## 2024-10-09 ENCOUNTER — NON-APPOINTMENT (OUTPATIENT)
Age: 74
End: 2024-10-09

## 2024-10-09 DIAGNOSIS — K21.9 GASTRO-ESOPHAGEAL REFLUX DISEASE W/OUT ESOPHAGITIS: ICD-10-CM

## 2024-10-09 PROBLEM — G20.A1 PARKINSON DISEASE: Status: ACTIVE | Noted: 2024-10-09

## 2024-10-09 PROBLEM — F03.90 DEMENTIA: Status: ACTIVE | Noted: 2024-10-09

## 2024-10-15 ENCOUNTER — TRANSCRIPTION ENCOUNTER (OUTPATIENT)
Age: 74
End: 2024-10-15

## 2024-10-15 ENCOUNTER — APPOINTMENT (OUTPATIENT)
Dept: HOME HEALTH SERVICES | Facility: HOME HEALTH | Age: 74
End: 2024-10-15

## 2024-10-15 VITALS
DIASTOLIC BLOOD PRESSURE: 62 MMHG | SYSTOLIC BLOOD PRESSURE: 100 MMHG | HEART RATE: 88 BPM | OXYGEN SATURATION: 95 % | TEMPERATURE: 97.5 F

## 2024-10-15 DIAGNOSIS — Z23 ENCOUNTER FOR IMMUNIZATION: ICD-10-CM

## 2024-10-15 DIAGNOSIS — G20.A1 PARKINSON'S DISEASE WITHOUT DYSKINESIA, WITHOUT MENTION OF FLUCTUATIONS: ICD-10-CM

## 2024-10-15 DIAGNOSIS — F03.911 UNSPECIFIED DEMENTIA, UNSPECIFIED SEVERITY, WITH AGITATION: ICD-10-CM

## 2024-10-15 DIAGNOSIS — G31.83 DEMENTIA WITH LEWY BODIES: ICD-10-CM

## 2024-10-15 DIAGNOSIS — F02.818 DEMENTIA WITH LEWY BODIES: ICD-10-CM

## 2024-10-15 DIAGNOSIS — F03.90 UNSPECIFIED DEMENTIA W/OUT BEHAVIORAL DISTURBANCE: ICD-10-CM

## 2024-10-15 DIAGNOSIS — C61 MALIGNANT NEOPLASM OF PROSTATE: ICD-10-CM

## 2024-10-15 DIAGNOSIS — Z51.5 ENCOUNTER FOR PALLIATIVE CARE: ICD-10-CM

## 2024-10-15 PROCEDURE — 90662 IIV NO PRSV INCREASED AG IM: CPT

## 2024-10-15 PROCEDURE — 99349 HOME/RES VST EST MOD MDM 40: CPT

## 2024-10-15 PROCEDURE — G0008: CPT

## 2024-10-22 ENCOUNTER — LABORATORY RESULT (OUTPATIENT)
Age: 74
End: 2024-10-22

## 2024-10-28 ENCOUNTER — APPOINTMENT (OUTPATIENT)
Dept: GERIATRICS | Facility: CLINIC | Age: 74
End: 2024-10-28
Payer: MEDICARE

## 2024-10-28 DIAGNOSIS — G47.00 INSOMNIA, UNSPECIFIED: ICD-10-CM

## 2024-10-28 DIAGNOSIS — F03.911 UNSPECIFIED DEMENTIA, UNSPECIFIED SEVERITY, WITH AGITATION: ICD-10-CM

## 2024-10-28 DIAGNOSIS — Z71.89 OTHER SPECIFIED COUNSELING: ICD-10-CM

## 2024-10-28 DIAGNOSIS — R63.4 ABNORMAL WEIGHT LOSS: ICD-10-CM

## 2024-10-28 DIAGNOSIS — K59.09 OTHER CONSTIPATION: ICD-10-CM

## 2024-10-28 DIAGNOSIS — R13.10 DYSPHAGIA, UNSPECIFIED: ICD-10-CM

## 2024-10-28 DIAGNOSIS — R53.81 OTHER MALAISE: ICD-10-CM

## 2024-10-28 DIAGNOSIS — K29.00 ACUTE GASTRITIS W/OUT BLEEDING: ICD-10-CM

## 2024-10-28 PROCEDURE — 99214 OFFICE O/P EST MOD 30 MIN: CPT

## 2024-10-28 PROCEDURE — G2211 COMPLEX E/M VISIT ADD ON: CPT

## 2024-11-12 ENCOUNTER — TRANSCRIPTION ENCOUNTER (OUTPATIENT)
Age: 74
End: 2024-11-12

## 2024-11-25 ENCOUNTER — APPOINTMENT (OUTPATIENT)
Dept: GERIATRICS | Facility: CLINIC | Age: 74
End: 2024-11-25
Payer: MEDICARE

## 2024-11-25 DIAGNOSIS — R63.4 ABNORMAL WEIGHT LOSS: ICD-10-CM

## 2024-11-25 DIAGNOSIS — Z71.89 OTHER SPECIFIED COUNSELING: ICD-10-CM

## 2024-11-25 DIAGNOSIS — G47.00 INSOMNIA, UNSPECIFIED: ICD-10-CM

## 2024-11-25 DIAGNOSIS — Z63.6 DEPENDENT RELATIVE NEEDING CARE AT HOME: ICD-10-CM

## 2024-11-25 DIAGNOSIS — K59.09 OTHER CONSTIPATION: ICD-10-CM

## 2024-11-25 DIAGNOSIS — R13.10 DYSPHAGIA, UNSPECIFIED: ICD-10-CM

## 2024-11-25 DIAGNOSIS — F03.911 UNSPECIFIED DEMENTIA, UNSPECIFIED SEVERITY, WITH AGITATION: ICD-10-CM

## 2024-11-25 DIAGNOSIS — R53.81 OTHER MALAISE: ICD-10-CM

## 2024-11-25 PROCEDURE — 99214 OFFICE O/P EST MOD 30 MIN: CPT

## 2024-11-25 PROCEDURE — G2211 COMPLEX E/M VISIT ADD ON: CPT

## 2024-11-25 SDOH — SOCIAL STABILITY - SOCIAL INSECURITY: DEPENDENT RELATIVE NEEDING CARE AT HOME: Z63.6

## 2024-12-17 ENCOUNTER — TRANSCRIPTION ENCOUNTER (OUTPATIENT)
Age: 74
End: 2024-12-17

## 2025-01-15 RX ORDER — ATORVASTATIN CALCIUM 10 MG/1
10 TABLET, FILM COATED ORAL
Refills: 0 | Status: ACTIVE | COMMUNITY
